# Patient Record
Sex: FEMALE | Race: WHITE | Employment: FULL TIME | URBAN - METROPOLITAN AREA
[De-identification: names, ages, dates, MRNs, and addresses within clinical notes are randomized per-mention and may not be internally consistent; named-entity substitution may affect disease eponyms.]

---

## 2017-04-03 ENCOUNTER — RADIANT APPOINTMENT (OUTPATIENT)
Dept: ULTRASOUND IMAGING | Facility: CLINIC | Age: 32
End: 2017-04-03
Attending: OBSTETRICS & GYNECOLOGY

## 2017-04-03 DIAGNOSIS — Z31.7 ENCOUNTER FOR PROCREATIVE MANAGEMENT AND COUNSELING FOR PERSON ACTING AS GESTATIONAL SURROGATE: ICD-10-CM

## 2017-04-03 PROCEDURE — 76830 TRANSVAGINAL US NON-OB: CPT

## 2017-04-11 ENCOUNTER — DOCUMENTATION ONLY (OUTPATIENT)
Dept: LAB | Facility: CLINIC | Age: 32
End: 2017-04-11

## 2017-04-13 ENCOUNTER — RADIANT APPOINTMENT (OUTPATIENT)
Dept: ULTRASOUND IMAGING | Facility: CLINIC | Age: 32
End: 2017-04-13
Attending: OBSTETRICS & GYNECOLOGY

## 2017-04-13 DIAGNOSIS — Z31.7 ENCOUNTER FOR PROCREATIVE MANAGEMENT AND COUNSELING FOR PERSON ACTING AS GESTATIONAL SURROGATE: ICD-10-CM

## 2017-04-13 DIAGNOSIS — Z33.3 PREGNANCY IN PERSON ACTING AS GESTATIONAL SURROGATE: Primary | ICD-10-CM

## 2017-04-13 LAB
B-HCG SERPL-ACNC: <1 IU/L (ref 0–5)
ESTRADIOL SERPL-MCNC: 42 PG/ML

## 2017-04-13 PROCEDURE — 36415 COLL VENOUS BLD VENIPUNCTURE: CPT | Performed by: OBSTETRICS & GYNECOLOGY

## 2017-04-13 PROCEDURE — 76830 TRANSVAGINAL US NON-OB: CPT

## 2017-04-13 PROCEDURE — 84702 CHORIONIC GONADOTROPIN TEST: CPT | Performed by: OBSTETRICS & GYNECOLOGY

## 2017-04-13 PROCEDURE — 82670 ASSAY OF TOTAL ESTRADIOL: CPT | Performed by: OBSTETRICS & GYNECOLOGY

## 2017-04-19 ENCOUNTER — RADIANT APPOINTMENT (OUTPATIENT)
Dept: ULTRASOUND IMAGING | Facility: CLINIC | Age: 32
End: 2017-04-19
Attending: OBSTETRICS & GYNECOLOGY

## 2017-04-19 DIAGNOSIS — Z33.3 PREGNANCY IN PERSON ACTING AS GESTATIONAL SURROGATE: ICD-10-CM

## 2017-04-19 DIAGNOSIS — Z31.7 ENCOUNTER FOR PROCREATIVE MANAGEMENT AND COUNSELING FOR PERSON ACTING AS GESTATIONAL SURROGATE: ICD-10-CM

## 2017-04-19 LAB
B-HCG SERPL-ACNC: <1 IU/L (ref 0–5)
ESTRADIOL SERPL-MCNC: 391 PG/ML

## 2017-04-19 PROCEDURE — 82670 ASSAY OF TOTAL ESTRADIOL: CPT | Performed by: OBSTETRICS & GYNECOLOGY

## 2017-04-19 PROCEDURE — 84702 CHORIONIC GONADOTROPIN TEST: CPT | Performed by: OBSTETRICS & GYNECOLOGY

## 2017-04-19 PROCEDURE — 76830 TRANSVAGINAL US NON-OB: CPT

## 2017-04-19 PROCEDURE — 36415 COLL VENOUS BLD VENIPUNCTURE: CPT | Performed by: OBSTETRICS & GYNECOLOGY

## 2017-04-26 ENCOUNTER — RADIANT APPOINTMENT (OUTPATIENT)
Dept: ULTRASOUND IMAGING | Facility: CLINIC | Age: 32
End: 2017-04-26
Attending: OBSTETRICS & GYNECOLOGY

## 2017-04-26 DIAGNOSIS — Z31.7 ENCOUNTER FOR PROCREATIVE MANAGEMENT AND COUNSELING FOR PERSON ACTING AS GESTATIONAL SURROGATE: ICD-10-CM

## 2017-04-26 DIAGNOSIS — Z33.3 PREGNANCY IN PERSON ACTING AS GESTATIONAL SURROGATE: ICD-10-CM

## 2017-04-26 LAB
B-HCG SERPL-ACNC: <1 IU/L (ref 0–5)
ESTRADIOL SERPL-MCNC: 700 PG/ML

## 2017-04-26 PROCEDURE — 82670 ASSAY OF TOTAL ESTRADIOL: CPT | Performed by: OBSTETRICS & GYNECOLOGY

## 2017-04-26 PROCEDURE — 76856 US EXAM PELVIC COMPLETE: CPT

## 2017-04-26 PROCEDURE — 76830 TRANSVAGINAL US NON-OB: CPT

## 2017-04-26 PROCEDURE — 36415 COLL VENOUS BLD VENIPUNCTURE: CPT | Performed by: OBSTETRICS & GYNECOLOGY

## 2017-04-26 PROCEDURE — 84702 CHORIONIC GONADOTROPIN TEST: CPT | Performed by: OBSTETRICS & GYNECOLOGY

## 2017-04-27 ENCOUNTER — ALLIED HEALTH/NURSE VISIT (OUTPATIENT)
Dept: NURSING | Facility: CLINIC | Age: 32
End: 2017-04-27

## 2017-04-27 DIAGNOSIS — Z23 ENCOUNTER FOR IMMUNIZATION: Primary | ICD-10-CM

## 2017-04-27 PROCEDURE — 90471 IMMUNIZATION ADMIN: CPT

## 2017-04-27 PROCEDURE — 90746 HEPB VACCINE 3 DOSE ADULT IM: CPT

## 2017-04-27 PROCEDURE — 99207 ZZC NO CHARGE NURSE ONLY: CPT

## 2017-04-27 NOTE — MR AVS SNAPSHOT
After Visit Summary   4/27/2017    Gretchen De Jesus    MRN: [id number]           Patient Information     Date Of Birth          1985        Visit Information        Provider Department      4/27/2017 2:30 PM [facility] [place] Amol Marcial        Today's Diagnoses     Encounter for immunization    -  1       Follow-ups after your visit        Who to contact     If you have questions or need follow up information about today's clinic visit or your schedule please contact [facility] directly at [phone].  Normal or non-critical lab and imaging results will be communicated to you by [employer]hart, letter or phone within 4 business days after the clinic has received the results. If you do not hear from us within 7 days, please contact the clinic through [employer]hart or phone. If you have a critical or abnormal lab result, we will notify you by phone as soon as possible.  Submit refill requests through [email or web address] or call your pharmacy and they will forward the refill request to us. Please allow 3 business days for your refill to be completed.          Additional Information About Your Visit        MyChart Information     [email or web address] gives you secure access to your electronic health record. If you see a primary care provider, you can also send messages to your care team and make appointments. If you have questions, please call your primary care clinic.  If you do not have a primary care provider, please call [phone] and they will assist you.        Care EveryWhere ID     This is your Care EveryWhere ID. This could be used by other organizations to access your [place] medical records  [id number]         Blood Pressure from Last 3 Encounters:   12/27/16 110/64   02/18/16 106/60   07/20/15 104/72    Weight from Last 3 Encounters:   12/27/16 144 lb (65.3 kg)   02/18/16 138 lb 1.6 oz (62.6 kg)   07/20/15 141 lb 12.8 oz (64.3 kg)              We Performed the Following     ADMIN 1st VACCINE      HEPATITIS B VACCINE,ADULT,IM        Primary Care Provider Office Phone # Fax #    Glendy Pilar Kendrick [phone]       [place] [address] [facility] [address] [address]        Thank you!     Thank you for choosing [facility]  for your care. Our goal is always to provide you with excellent care. Hearing back from our patients is one way we can continue to improve our services. Please take a few minutes to complete the written survey that you may receive in the mail after your visit with us. Thank you!             Your Updated Medication List - Protect others around you: Learn how to safely use, store and throw away your medicines at www.disposemymeds.org.      Notice  As of 4/27/2017  2:44 PM    You have not been prescribed any medications.

## 2017-04-27 NOTE — NURSING NOTE
Screening Questionnaire for Adult Immunization    Are you sick today?   No   Do you have allergies to medications, food, a vaccine component or latex?   No   Have you ever had a serious reaction after receiving a vaccination?   No   Do you have a long-term health problem with heart disease, lung disease, asthma, kidney disease, metabolic disease (e.g. diabetes), anemia, or other blood disorder?   No   Do you have cancer, leukemia, HIV/AIDS, or any other immune system problem?   No   In the past 3 months, have you taken medications that affect  your immune system, such as prednisone, other steroids, or anticancer drugs; drugs for the treatment of rheumatoid arthritis, Crohn s disease, or psoriasis; or have you had radiation treatments?   No   Have you had a seizure, or a brain or other nervous system problem?   No   During the past year, have you received a transfusion of blood or blood     products, or been given immune (gamma) globulin or antiviral drug?   No   For women: Are you pregnant or is there a chance you could become        pregnant during the next month?   No   Have you received any vaccinations in the past 4 weeks?   No     Immunization questionnaire answers were all negative.      MNVFC doesn't apply on this patient    Per orders of Dr. Ej Pinedo, injection of Hep B given by Ca Massey. Patient instructed to remain in clinic for 20 minutes afterwards, and to report any adverse reaction to me immediately.       Screening performed by Ca Massey on 4/27/2017 at 2:41 PM.

## 2017-05-05 DIAGNOSIS — E28.9 DISORDER OF ENDOCRINE OVARY: Primary | ICD-10-CM

## 2017-05-08 DIAGNOSIS — E28.9 DISORDER OF ENDOCRINE OVARY: ICD-10-CM

## 2017-05-08 LAB
ESTRADIOL SERPL-MCNC: 451 PG/ML
PROGEST SERPL-MCNC: 65 NG/ML

## 2017-05-08 PROCEDURE — 36415 COLL VENOUS BLD VENIPUNCTURE: CPT | Performed by: OBSTETRICS & GYNECOLOGY

## 2017-05-08 PROCEDURE — 84144 ASSAY OF PROGESTERONE: CPT | Performed by: OBSTETRICS & GYNECOLOGY

## 2017-05-08 PROCEDURE — 82670 ASSAY OF TOTAL ESTRADIOL: CPT | Performed by: OBSTETRICS & GYNECOLOGY

## 2017-05-12 DIAGNOSIS — E28.9 DISORDER OF ENDOCRINE OVARY: ICD-10-CM

## 2017-05-12 LAB
ESTRADIOL SERPL-MCNC: 536 PG/ML
PROGEST SERPL-MCNC: 72.9 NG/ML

## 2017-05-12 PROCEDURE — 84144 ASSAY OF PROGESTERONE: CPT | Performed by: OBSTETRICS & GYNECOLOGY

## 2017-05-12 PROCEDURE — 82670 ASSAY OF TOTAL ESTRADIOL: CPT | Performed by: OBSTETRICS & GYNECOLOGY

## 2017-05-12 PROCEDURE — 36415 COLL VENOUS BLD VENIPUNCTURE: CPT | Performed by: OBSTETRICS & GYNECOLOGY

## 2017-05-18 DIAGNOSIS — E28.9 DISORDER OF ENDOCRINE OVARY: ICD-10-CM

## 2017-05-18 LAB
B-HCG SERPL-ACNC: 765 IU/L (ref 0–5)
ESTRADIOL SERPL-MCNC: 1259 PG/ML
PROGEST SERPL-MCNC: 55.5 NG/ML

## 2017-05-18 PROCEDURE — 36415 COLL VENOUS BLD VENIPUNCTURE: CPT | Performed by: OBSTETRICS & GYNECOLOGY

## 2017-05-18 PROCEDURE — 84702 CHORIONIC GONADOTROPIN TEST: CPT | Performed by: OBSTETRICS & GYNECOLOGY

## 2017-05-18 PROCEDURE — 84144 ASSAY OF PROGESTERONE: CPT | Performed by: OBSTETRICS & GYNECOLOGY

## 2017-05-18 PROCEDURE — 82670 ASSAY OF TOTAL ESTRADIOL: CPT | Performed by: OBSTETRICS & GYNECOLOGY

## 2017-05-22 DIAGNOSIS — E28.9 DISORDER OF ENDOCRINE OVARY: ICD-10-CM

## 2017-05-22 LAB
B-HCG SERPL-ACNC: 2724 IU/L (ref 0–5)
ESTRADIOL SERPL-MCNC: 823 PG/ML
PROGEST SERPL-MCNC: 67.7 NG/ML

## 2017-05-22 PROCEDURE — 36415 COLL VENOUS BLD VENIPUNCTURE: CPT | Performed by: OBSTETRICS & GYNECOLOGY

## 2017-05-22 PROCEDURE — 82670 ASSAY OF TOTAL ESTRADIOL: CPT | Performed by: OBSTETRICS & GYNECOLOGY

## 2017-05-22 PROCEDURE — 84702 CHORIONIC GONADOTROPIN TEST: CPT | Performed by: OBSTETRICS & GYNECOLOGY

## 2017-05-22 PROCEDURE — 84144 ASSAY OF PROGESTERONE: CPT | Performed by: OBSTETRICS & GYNECOLOGY

## 2017-05-30 ENCOUNTER — RADIANT APPOINTMENT (OUTPATIENT)
Dept: ULTRASOUND IMAGING | Facility: CLINIC | Age: 32
End: 2017-05-30
Attending: OBSTETRICS & GYNECOLOGY

## 2017-05-30 DIAGNOSIS — N97.9 FEMALE INFERTILITY: ICD-10-CM

## 2017-05-30 DIAGNOSIS — N97.9 INFERTILITY, FEMALE: ICD-10-CM

## 2017-05-30 LAB
ERYTHROCYTE [DISTWIDTH] IN BLOOD BY AUTOMATED COUNT: 12.5 % (ref 10–15)
ESTRADIOL SERPL-MCNC: 989 PG/ML
HCT VFR BLD AUTO: 40.3 % (ref 35–47)
HGB BLD-MCNC: 13.3 G/DL (ref 11.7–15.7)
MCH RBC QN AUTO: 29.6 PG (ref 26.5–33)
MCHC RBC AUTO-ENTMCNC: 33 G/DL (ref 31.5–36.5)
MCV RBC AUTO: 90 FL (ref 78–100)
PLATELET # BLD AUTO: 329 10E9/L (ref 150–450)
PROGEST SERPL-MCNC: 78.1 NG/ML
RBC # BLD AUTO: 4.5 10E12/L (ref 3.8–5.2)
WBC # BLD AUTO: 12.3 10E9/L (ref 4–11)

## 2017-05-30 PROCEDURE — 76817 TRANSVAGINAL US OBSTETRIC: CPT

## 2017-05-30 PROCEDURE — 36415 COLL VENOUS BLD VENIPUNCTURE: CPT | Performed by: OBSTETRICS & GYNECOLOGY

## 2017-05-30 PROCEDURE — 85027 COMPLETE CBC AUTOMATED: CPT | Performed by: OBSTETRICS & GYNECOLOGY

## 2017-05-30 PROCEDURE — 84144 ASSAY OF PROGESTERONE: CPT | Performed by: OBSTETRICS & GYNECOLOGY

## 2017-05-30 PROCEDURE — 82670 ASSAY OF TOTAL ESTRADIOL: CPT | Performed by: OBSTETRICS & GYNECOLOGY

## 2017-06-08 DIAGNOSIS — N97.9 FEMALE INFERTILITY: ICD-10-CM

## 2017-06-08 LAB
ESTRADIOL SERPL-MCNC: 1332 PG/ML
PROGEST SERPL-MCNC: 67.8 NG/ML

## 2017-06-08 PROCEDURE — 84144 ASSAY OF PROGESTERONE: CPT | Performed by: OBSTETRICS & GYNECOLOGY

## 2017-06-08 PROCEDURE — 82670 ASSAY OF TOTAL ESTRADIOL: CPT | Performed by: OBSTETRICS & GYNECOLOGY

## 2017-06-08 PROCEDURE — 36415 COLL VENOUS BLD VENIPUNCTURE: CPT | Performed by: OBSTETRICS & GYNECOLOGY

## 2017-06-15 ENCOUNTER — RADIANT APPOINTMENT (OUTPATIENT)
Dept: ULTRASOUND IMAGING | Facility: CLINIC | Age: 32
End: 2017-06-15
Attending: OBSTETRICS & GYNECOLOGY

## 2017-06-15 DIAGNOSIS — N97.9 INFERTILITY, FEMALE: ICD-10-CM

## 2017-06-15 DIAGNOSIS — N97.9 FEMALE INFERTILITY: ICD-10-CM

## 2017-06-15 LAB
ESTRADIOL SERPL-MCNC: 1937 PG/ML
PROGEST SERPL-MCNC: 71.4 NG/ML

## 2017-06-15 PROCEDURE — 36415 COLL VENOUS BLD VENIPUNCTURE: CPT | Performed by: OBSTETRICS & GYNECOLOGY

## 2017-06-15 PROCEDURE — 82670 ASSAY OF TOTAL ESTRADIOL: CPT | Performed by: OBSTETRICS & GYNECOLOGY

## 2017-06-15 PROCEDURE — 84144 ASSAY OF PROGESTERONE: CPT | Performed by: OBSTETRICS & GYNECOLOGY

## 2017-06-15 PROCEDURE — 76817 TRANSVAGINAL US OBSTETRIC: CPT

## 2017-06-22 ENCOUNTER — RADIANT APPOINTMENT (OUTPATIENT)
Dept: ULTRASOUND IMAGING | Facility: CLINIC | Age: 32
End: 2017-06-22
Attending: OBSTETRICS & GYNECOLOGY

## 2017-06-22 DIAGNOSIS — N97.9 FEMALE INFERTILITY: ICD-10-CM

## 2017-06-22 DIAGNOSIS — N97.9 INFERTILITY, FEMALE: ICD-10-CM

## 2017-06-22 LAB
ESTRADIOL SERPL-MCNC: 2115 PG/ML
PROGEST SERPL-MCNC: 69.7 NG/ML

## 2017-06-22 PROCEDURE — 82670 ASSAY OF TOTAL ESTRADIOL: CPT | Performed by: OBSTETRICS & GYNECOLOGY

## 2017-06-22 PROCEDURE — 76817 TRANSVAGINAL US OBSTETRIC: CPT

## 2017-06-22 PROCEDURE — 76802 OB US < 14 WKS ADDL FETUS: CPT

## 2017-06-22 PROCEDURE — 84144 ASSAY OF PROGESTERONE: CPT | Performed by: OBSTETRICS & GYNECOLOGY

## 2017-06-22 PROCEDURE — 76801 OB US < 14 WKS SINGLE FETUS: CPT

## 2017-06-22 PROCEDURE — 36415 COLL VENOUS BLD VENIPUNCTURE: CPT | Performed by: OBSTETRICS & GYNECOLOGY

## 2017-07-13 ENCOUNTER — TRANSFERRED RECORDS (OUTPATIENT)
Dept: HEALTH INFORMATION MANAGEMENT | Facility: CLINIC | Age: 32
End: 2017-07-13

## 2017-12-15 ENCOUNTER — TRANSFERRED RECORDS (OUTPATIENT)
Dept: HEALTH INFORMATION MANAGEMENT | Facility: CLINIC | Age: 32
End: 2017-12-15

## 2018-10-31 ENCOUNTER — OFFICE VISIT (OUTPATIENT)
Dept: FAMILY MEDICINE | Facility: CLINIC | Age: 33
End: 2018-10-31
Payer: COMMERCIAL

## 2018-10-31 VITALS
DIASTOLIC BLOOD PRESSURE: 76 MMHG | WEIGHT: 161 LBS | RESPIRATION RATE: 16 BRPM | TEMPERATURE: 97.2 F | OXYGEN SATURATION: 97 % | HEART RATE: 92 BPM | BODY MASS INDEX: 29.63 KG/M2 | HEIGHT: 62 IN | SYSTOLIC BLOOD PRESSURE: 110 MMHG

## 2018-10-31 DIAGNOSIS — Z00.00 WELL ADULT EXAM: Primary | ICD-10-CM

## 2018-10-31 LAB
CHOLEST SERPL-MCNC: 171 MG/DL
HDLC SERPL-MCNC: 52 MG/DL
LDLC SERPL CALC-MCNC: 93 MG/DL
NONHDLC SERPL-MCNC: 119 MG/DL
TRIGL SERPL-MCNC: 130 MG/DL

## 2018-10-31 PROCEDURE — 36415 COLL VENOUS BLD VENIPUNCTURE: CPT | Performed by: OBSTETRICS & GYNECOLOGY

## 2018-10-31 PROCEDURE — G0145 SCR C/V CYTO,THINLAYER,RESCR: HCPCS | Performed by: OBSTETRICS & GYNECOLOGY

## 2018-10-31 PROCEDURE — 87591 N.GONORRHOEAE DNA AMP PROB: CPT | Performed by: OBSTETRICS & GYNECOLOGY

## 2018-10-31 PROCEDURE — 80061 LIPID PANEL: CPT | Performed by: OBSTETRICS & GYNECOLOGY

## 2018-10-31 PROCEDURE — 87624 HPV HI-RISK TYP POOLED RSLT: CPT | Performed by: OBSTETRICS & GYNECOLOGY

## 2018-10-31 PROCEDURE — 99395 PREV VISIT EST AGE 18-39: CPT | Performed by: OBSTETRICS & GYNECOLOGY

## 2018-10-31 PROCEDURE — 87491 CHLMYD TRACH DNA AMP PROBE: CPT | Performed by: OBSTETRICS & GYNECOLOGY

## 2018-10-31 ASSESSMENT — ENCOUNTER SYMPTOMS
DIARRHEA: 0
DYSURIA: 0
NERVOUS/ANXIOUS: 0
PALPITATIONS: 0
COUGH: 0
DIZZINESS: 0
HEARTBURN: 0
ABDOMINAL PAIN: 0
SHORTNESS OF BREATH: 0
WEAKNESS: 0
SORE THROAT: 0
CONSTIPATION: 0
PARESTHESIAS: 0
NAUSEA: 0
HEADACHES: 0
HEMATURIA: 0
ARTHRALGIAS: 0
FEVER: 0
MYALGIAS: 1
JOINT SWELLING: 0
BREAST MASS: 0
EYE PAIN: 0
CHILLS: 0
HEMATOCHEZIA: 0
FREQUENCY: 0

## 2018-10-31 ASSESSMENT — PAIN SCALES - GENERAL: PAINLEVEL: NO PAIN (0)

## 2018-10-31 NOTE — PROGRESS NOTES
Subjective:Gretchen is here for yearly physical. Current concerns are:  She wants a pap/pelvic exam because she is planning to become a surrogate parent.  She previously delivered twins by  section for a surrogacy program.  She tolerated that well.  Plans to do a single embryo this time.        Past Medical History:   Diagnosis Date     Abnormal glandular Papanicolaou smear of cervix     Abn. Pap smear (cervix)     Allergic rhinitis, cause unspecified     Allergic rhinitis     Cervical dysplasia      Hypoglycemia      Migraine      Other spontaneous pneumothorax 06    D/C 06-left spontaneous pneumothorax     PCOS (polycystic ovarian syndrome)       No current outpatient prescriptions on file.      No Known Allergies   History   Smoking Status     Former Smoker     Packs/day: 0.50     Years: 3.00     Quit date: 2004   Smokeless Tobacco     Never Used     Comment:  smokes - discussed secondhand smoke      Past Surgical History:   Procedure Laterality Date     CONIZATION LEEP  08    LEEP     HC EXCIS PRIMARY GANGLION WRIST  7/28/10     HC EXCISION TENDON SHEATH LESION, HAND/FINGR  09/15/10    left hand     HYSTEROSCOPY  02/01/10     and dilation and curettage and removal of uterine polyp     SURGICAL HISTORY OF -   age 3    ?eye surgery      Social History   Substance Use Topics     Smoking status: Former Smoker     Packs/day: 0.50     Years: 3.00     Quit date: 2004     Smokeless tobacco: Never Used      Comment:  smokes - discussed secondhand smoke     Alcohol use Yes      Comment: infrequently      Family History   Problem Relation Age of Onset     Neurologic Disorder Mother      brain, fibromyalgia     Cancer Maternal Grandfather      skin     HEART DISEASE Maternal Grandfather      Breast Cancer Maternal Grandmother      HEART DISEASE Paternal Grandfather      Diabetes Paternal Grandfather      Cerebrovascular Disease Paternal Grandfather      Breast Cancer Maternal  "Aunt        ROS: A 12 point review of systems is negative except for the following: See above      Physical Exam: /76  Pulse 92  Temp 97.2  F (36.2  C) (Temporal)  Resp 16  Ht 5' 2\" (1.575 m)  Wt 161 lb (73 kg)  SpO2 97%  Breastfeeding? No  BMI 29.45 kg/m2  HEENT:    Sclerae and conjunctiva are normal.   Ear canals and TMs look normal.  Nasal mucosa is pink  - no polyps or masses seen.  sinuses are non tender to palpation.  Throat is unremarkable . Mucous membranes are moist.   Neck is supple, mobile, no adenopathy or masses palpable. The thyroid feels normal.   Normal range of motion noted.  Chest is clear to auscultation.  No wheezes, rales or rhonchi heard.  Cardiac exam is normal with s1, s2, no murmurs or adventitious sounds.Normal rate and rhythm is heard.   Abdomen is soft,  nondistended, No masses felt.No HSM. No guarding or rigidity or rebound   noted. Palpation reveals  no    tenderness   Normal bowel sounds heard.  The  section scar is well-healed.  Pelvic exam:My nurse Cecy   was present to chaperone the exam.  The external genitalia appeared normal.    The vaginal vault was without bleeding  or   discharge   or odor.   The cervix was smooth   and shiny and normal in appearance.    A pap was obtained.   A gen probe   was obtained.    No vaginal support defects were noted,    Bimanual exam revealed a 6 week  sized uterus. It does not   descend   well in the vaginal vault.    No adnexal masses were felt.    There was no  cervical motion tenderness.    Exam was somewhat limited by the patient's body habitus.            The breast exam was declined.      We did discuss the option for an exam   and I suggested that she should be doing a self-breast exam   monthly and after the age of 40 a yearly mammogram   and she feels comfortable that this is sufficient.         Assessment/Plan:    The yearly exam is normal.        Additional Plan:Diet and rest and exercise discussed.      I have " advised going for a 5 mile walk daily if possible       See labs and orders.    .Immunizations reviewed(TDAP, pneumovax, shingles vaccine,etc)and discussed-including advice for yearly flu shot/tetanus update.       Vaccines were discussed and  declined        Calcium supplementation advised       Labs done: see orders -lipids-    I advised the following exams with specialists:    1. Dental evaluation yearly    2. Dermatology evaluation for mole exam yearly    3. Ophthalmology exam yearly to check for glaucoma, etc      Flu vax offered- she declined    Living will was discussed.         Followup in 12   months, sooner if concerns arise.   ALMA DELIA Rodriges MD(electronic signature)    Answers for HPI/ROS submitted by the patient on 10/31/2018   Annual Exam:  Frequency of exercise:: 1 day/week  Getting at least 3 servings of Calcium per day:: Yes  Diet:: Other  Taking medications regularly:: No  Medication side effects:: None  Bi-annual eye exam:: NO  Dental care twice a year:: NO  Sleep apnea or symptoms of sleep apnea:: None  Positive for the following: myalgias  Negative for the following: abdominal pain, Blood in stool, Blood in urine, chest pain, chills, congestion, constipation, cough, diarrhea, dizziness, ear pain, eye pain, nervous/anxious, fever, frequency, genital sores, headaches, hearing loss, heartburn, arthralgias, joint swelling, peripheral edema, mood changes, nausea, dysuria, palpitations, Skin sensation changes, sore throat, urgency, rash, shortness of breath, visual disturbance, weakness  pelvic pain: No  vaginal bleeding: No  vaginal discharge: No  tenderness: No  breast mass: No  breast discharge: No  Additional concerns today:: No  PHQ-2 Score: 0  Duration of exercise:: 15-30 minutes  Barriers to taking medications:: None

## 2018-10-31 NOTE — PROGRESS NOTES
Cecy Please inform Gretchen/ or caretaker  that this result(s) is/are normal.  Thanks. ALMA DELIA Rodriges MD

## 2018-10-31 NOTE — MR AVS SNAPSHOT
After Visit Summary   10/31/2018    Gretchen De Jesus    MRN: [id number]           Patient Information     Date Of Birth          1985        Visit Information        Provider Department      10/31/2018 10:30 AM Eamon Rodriges MD [facility]        Today's Diagnoses     Well adult exam    -  1       Follow-ups after your visit        Follow-up notes from your care team     Return in about 1 year (around 10/31/2019) for Physical Exam.      Future tests that were ordered for you today     Open Future Orders        Priority Expected Expires Ordered    Lipid Profile Routine  10/31/2019 10/31/2018            Who to contact     If you have questions or need follow up information about today's clinic visit or your schedule please contact [facility] directly at [phone].  Normal or non-critical lab and imaging results will be communicated to you by MyChart, letter or phone within 4 business days after the clinic has received the results. If you do not hear from us within 7 days, please contact the clinic through MyChart or phone. If you have a critical or abnormal lab result, we will notify you by phone as soon as possible.  Submit refill requests through [employer] or call your pharmacy and they will forward the refill request to us. Please allow 3 business days for your refill to be completed.          Additional Information About Your Visit        MyChart Information     [employer] gives you secure access to your electronic health record. If you see a primary care provider, you can also send messages to your care team and make appointments. If you have questions, please call your primary care clinic.  If you do not have a primary care provider, please call [phone] and they will assist you.        Care EveryWhere ID     This is your Care EveryWhere ID. This could be used by other organizations to access your [place] medical records  [id number]       "  Your Vitals Were     Pulse Temperature Respirations Height Pulse Oximetry Breastfeeding?    92 97.2  F (36.2  C) (Temporal) 16 5' 2\" (1.575 m) 97% No    BMI (Body Mass Index)                   29.45 kg/m2            Blood Pressure from Last 3 Encounters:   10/31/18 110/76   12/27/16 110/64   02/18/16 106/60    Weight from Last 3 Encounters:   10/31/18 161 lb (73 kg)   12/27/16 144 lb (65.3 kg)   02/18/16 138 lb 1.6 oz (62.6 kg)              We Performed the Following     CHLAMYDIA TRACHOMATIS PCR     HPV High Risk Types DNA Cervical     NEISSERIA GONORRHOEA PCR     Pap imaged thin layer screen with HPV - recommended age 30 - 65 years (select HPV order below)        Primary Care Provider Office Phone # Fax #    Glendy Kendrick [phone] [address] [place] [address]        Equal Access to Services     JAC ESPARZA : Hadii carlitos ku hadasho Soomaali, waaxda luqadaha, qaybta kaalmada adeegyada, waxay kalain haywilyn tammi salazar . So [facility] [phone].    ATENCIÓN: Si habla espmax, tiene a rojas disposición servicios gratuitos de asistencia lingüística. Llame al [phone].    We comply with applicable federal civil rights laws and [place] laws. We do not discriminate on the basis of race, color, national origin, age, disability, sex, sexual orientation, or gender identity.            Thank you!     Thank you for choosing [facility]  for your care. Our goal is always to provide you with excellent care. Hearing back from our patients is one way we can continue to improve our services. Please take a few minutes to complete the written survey that you may receive in the mail after your visit with us. Thank you!             Your Updated Medication List - Protect others around you: Learn how to safely use, store and throw away your medicines at www.disposemymeds.org.      Notice  As of 10/31/2018 11:19 AM    You have not been prescribed any medications.    "

## 2018-11-01 LAB
C TRACH DNA SPEC QL NAA+PROBE: NEGATIVE
N GONORRHOEA DNA SPEC QL NAA+PROBE: NEGATIVE
SPECIMEN SOURCE: NORMAL
SPECIMEN SOURCE: NORMAL

## 2018-11-02 LAB
COPATH REPORT: NORMAL
PAP: NORMAL

## 2018-11-05 LAB
FINAL DIAGNOSIS: NORMAL
HPV HR 12 DNA CVX QL NAA+PROBE: NEGATIVE
HPV16 DNA SPEC QL NAA+PROBE: NEGATIVE
HPV18 DNA SPEC QL NAA+PROBE: NEGATIVE
SPECIMEN DESCRIPTION: NORMAL
SPECIMEN SOURCE CVX/VAG CYTO: NORMAL

## 2019-01-25 ENCOUNTER — OFFICE VISIT (OUTPATIENT)
Dept: FAMILY MEDICINE | Facility: OTHER | Age: 34
End: 2019-01-25
Payer: COMMERCIAL

## 2019-01-25 VITALS
BODY MASS INDEX: 30.18 KG/M2 | TEMPERATURE: 98.3 F | OXYGEN SATURATION: 97 % | HEIGHT: 62 IN | HEART RATE: 107 BPM | SYSTOLIC BLOOD PRESSURE: 114 MMHG | DIASTOLIC BLOOD PRESSURE: 64 MMHG | WEIGHT: 164 LBS

## 2019-01-25 DIAGNOSIS — R05.9 COUGH: ICD-10-CM

## 2019-01-25 DIAGNOSIS — J02.9 SORE THROAT: Primary | ICD-10-CM

## 2019-01-25 LAB
DEPRECATED S PYO AG THROAT QL EIA: NORMAL
SPECIMEN SOURCE: NORMAL

## 2019-01-25 PROCEDURE — 87880 STREP A ASSAY W/OPTIC: CPT | Performed by: PHYSICIAN ASSISTANT

## 2019-01-25 PROCEDURE — 99213 OFFICE O/P EST LOW 20 MIN: CPT | Performed by: PHYSICIAN ASSISTANT

## 2019-01-25 PROCEDURE — 87081 CULTURE SCREEN ONLY: CPT | Performed by: PHYSICIAN ASSISTANT

## 2019-01-25 RX ORDER — ALBUTEROL SULFATE 90 UG/1
2 AEROSOL, METERED RESPIRATORY (INHALATION) EVERY 6 HOURS
Qty: 1 INHALER | Refills: 1 | Status: SHIPPED | OUTPATIENT
Start: 2019-01-25 | End: 2019-03-21

## 2019-01-25 ASSESSMENT — MIFFLIN-ST. JEOR: SCORE: 1393.18

## 2019-01-25 ASSESSMENT — PAIN SCALES - GENERAL: PAINLEVEL: MODERATE PAIN (4)

## 2019-01-25 NOTE — PROGRESS NOTES
SUBJECTIVE:   Gretchen De Jesus is a 34 year old female who presents to clinic today for the following health issues:    HPI  Acute Illness   Acute illness concerns: sore throat cough    Onset: 2 days    Fever: YES    Chills/Sweats: YES    Headache (location?): YES    Sinus Pressure:no    Conjunctivitis:  yes    Ear Pain: no    Rhinorrhea: YES    Congestion: no     Sore Throat: YES     Cough: YES    Wheeze: no     Decreased Appetite: no     Nausea: no     Vomiting: no     Diarrhea:  no     Dysuria/Freq.: no     Fatigue/Achiness: YES    Sick/Strep Exposure: YES     Therapies Tried and outcome: nyquil and tylenol    Patient presents today for evaluation of a sore throat and cough. She reports throat pain started 2 days ago. Feels swollen and irritated. She has had some fevers off and on. She reports a headache as well. No sinus congestion or pain. She was exposed to friend's kids who had strep.    She also reports she has had a hacking cough that has been persisting for several months. Not bothersome - just annoying. She denies chest tightness or shortness of breath. She reports getting similar symptoms each year since she had a collapsed lung as a teen. Albuterol is usually very helpful.     Problem list and histories reviewed & adjusted, as indicated.  Additional history: as documented    Patient Active Problem List   Diagnosis     CARDIOVASCULAR SCREENING; LDL GOAL LESS THAN 160     HSIL (high grade squamous intraepithelial lesion) on Pap smear     Other disorder of menstruation and other abnormal bleeding from female genital tract     Past Surgical History:   Procedure Laterality Date     CONIZATION LEEP  05/12/08    LEEP     HC EXCIS PRIMARY GANGLION WRIST  7/28/10     HC EXCISION TENDON SHEATH LESION, HAND/FINGR  09/15/10    left hand     HYSTEROSCOPY  02/01/10     and dilation and curettage and removal of uterine polyp     SURGICAL HISTORY OF -   age 3    ?eye surgery       Social History     Tobacco Use  "    Smoking status: Former Smoker     Packs/day: 0.50     Years: 3.00     Pack years: 1.50     Last attempt to quit: 2004     Years since quittin.7     Smokeless tobacco: Never Used     Tobacco comment:  smokes - discussed secondhand smoke   Substance Use Topics     Alcohol use: Yes     Comment: infrequently     Family History   Problem Relation Age of Onset     Neurologic Disorder Mother         brain, fibromyalgia     Cancer Maternal Grandfather         skin     Heart Disease Maternal Grandfather      Breast Cancer Maternal Grandmother      Heart Disease Paternal Grandfather      Diabetes Paternal Grandfather      Cerebrovascular Disease Paternal Grandfather      Breast Cancer Maternal Aunt          No current outpatient medications on file.     No Known Allergies  BP Readings from Last 3 Encounters:   19 114/64   10/31/18 110/76   16 110/64    Wt Readings from Last 3 Encounters:   19 74.4 kg (164 lb)   10/31/18 73 kg (161 lb)   16 65.3 kg (144 lb)         ROS:  Constitutional, HEENT, cardiovascular, pulmonary, gi and gu systems are negative, except as otherwise noted.    OBJECTIVE:     /64   Pulse 107   Temp 98.3  F (36.8  C) (Temporal)   Ht 1.568 m (5' 1.75\")   Wt 74.4 kg (164 lb)   LMP 2019 (Exact Date)   SpO2 97%   BMI 30.24 kg/m    Body mass index is 30.24 kg/m .  GENERAL: healthy, alert and no distress  HENT: ear canals and TM's normal, nose and mouth without ulcers or lesions  NECK: no adenopathy, no asymmetry, masses, or scars and thyroid normal to palpation  RESP: lungs clear to auscultation - no rales, rhonchi or wheezes  CV: regular rate and rhythm, normal S1 S2, no S3 or S4, no murmur, click or rub, no peripheral edema and peripheral pulses strong  SKIN: no suspicious lesions or rashes  PSYCH: mentation appears normal, affect normal/bright    Diagnostic Test Results:  Strep screen - Negative    ASSESSMENT/PLAN:     1. Sore throat  Rapid strep " negative. Culture pending. Will call if throat culture is positive. Encouraged continuation of supportive cares with rest, fluids and tylenol/ibuprofen as needed.   - Strep, Rapid Screen  - Beta strep group A culture    2. Cough  Patient has done well with albuterol in the past. Discussed use every 4-6 hours as needed. Recheck if symptoms worsen or persist.   - albuterol (PROAIR HFA/PROVENTIL HFA/VENTOLIN HFA) 108 (90 Base) MCG/ACT inhaler; Inhale 2 puffs into the lungs every 6 hours  Dispense: 1 Inhaler; Refill: 1    The patient indicates understanding of these issues and agrees with the plan.    Yessenia Tompkins PA-C  [facility]

## 2019-01-27 LAB
BACTERIA SPEC CULT: NORMAL
SPECIMEN SOURCE: NORMAL

## 2019-02-27 DIAGNOSIS — Z13.89 SCREENING FOR SUBSTANCE ABUSE: ICD-10-CM

## 2019-02-27 DIAGNOSIS — Z13.228 SCREENING FOR METABOLIC DISORDER: ICD-10-CM

## 2019-02-27 DIAGNOSIS — Z11.8 SPECIAL SCREENING EXAMINATION FOR CHLAMYDIAL DISEASE: ICD-10-CM

## 2019-02-27 DIAGNOSIS — Z11.59 SPECIAL SCREENING EXAMINATION FOR VIRAL DISEASE: ICD-10-CM

## 2019-02-27 DIAGNOSIS — Z13.29 THYROID DISORDER SCREEN: ICD-10-CM

## 2019-02-27 DIAGNOSIS — Z11.4 SCREENING FOR HUMAN IMMUNODEFICIENCY VIRUS: ICD-10-CM

## 2019-02-27 DIAGNOSIS — Z01.84 IMMUNITY STATUS TESTING: ICD-10-CM

## 2019-02-27 DIAGNOSIS — Z01.83 ENCOUNTER FOR BLOOD TYPING: ICD-10-CM

## 2019-02-27 DIAGNOSIS — Z01.812 ENCOUNTER FOR PREPROCEDURAL LABORATORY EXAMINATION: ICD-10-CM

## 2019-02-27 DIAGNOSIS — Z31.89 ENCOUNTER FOR OTHER PROCREATIVE MANAGEMENT: Primary | ICD-10-CM

## 2019-02-27 LAB
AMPHETAMINES UR QL: NOT DETECTED NG/ML
BARBITURATES UR QL SCN: NOT DETECTED NG/ML
BASOPHILS # BLD AUTO: 0 10E9/L (ref 0–0.2)
BASOPHILS NFR BLD AUTO: 0.2 %
BENZODIAZ UR QL SCN: NOT DETECTED NG/ML
BUPRENORPHINE UR QL: NOT DETECTED NG/ML
CANNABINOIDS UR QL: NOT DETECTED NG/ML
COCAINE UR QL SCN: NOT DETECTED NG/ML
D-METHAMPHET UR QL: ABNORMAL NG/ML
DIFFERENTIAL METHOD BLD: NORMAL
EOSINOPHIL # BLD AUTO: 0.1 10E9/L (ref 0–0.7)
EOSINOPHIL NFR BLD AUTO: 1.4 %
ERYTHROCYTE [DISTWIDTH] IN BLOOD BY AUTOMATED COUNT: 12.8 % (ref 10–15)
HCT VFR BLD AUTO: 39.2 % (ref 35–47)
HGB BLD-MCNC: 12.7 G/DL (ref 11.7–15.7)
LYMPHOCYTES # BLD AUTO: 2.9 10E9/L (ref 0.8–5.3)
LYMPHOCYTES NFR BLD AUTO: 34.5 %
MCH RBC QN AUTO: 29.2 PG (ref 26.5–33)
MCHC RBC AUTO-ENTMCNC: 32.4 G/DL (ref 31.5–36.5)
MCV RBC AUTO: 90 FL (ref 78–100)
METHADONE UR QL SCN: NOT DETECTED NG/ML
MONOCYTES # BLD AUTO: 0.6 10E9/L (ref 0–1.3)
MONOCYTES NFR BLD AUTO: 7.3 %
NEUTROPHILS # BLD AUTO: 4.8 10E9/L (ref 1.6–8.3)
NEUTROPHILS NFR BLD AUTO: 56.6 %
OPIATES UR QL SCN: ABNORMAL NG/ML
OXYCODONE UR QL SCN: NOT DETECTED NG/ML
PCP UR QL SCN: NOT DETECTED NG/ML
PLATELET # BLD AUTO: 323 10E9/L (ref 150–450)
PROPOXYPH UR QL: NOT DETECTED NG/ML
RBC # BLD AUTO: 4.35 10E12/L (ref 3.8–5.2)
TRICYCLICS UR QL SCN: NOT DETECTED NG/ML
WBC # BLD AUTO: 8.5 10E9/L (ref 4–11)

## 2019-02-27 PROCEDURE — 87340 HEPATITIS B SURFACE AG IA: CPT | Performed by: SPECIALIST

## 2019-02-27 PROCEDURE — 84443 ASSAY THYROID STIM HORMONE: CPT | Performed by: SPECIALIST

## 2019-02-27 PROCEDURE — 84439 ASSAY OF FREE THYROXINE: CPT | Performed by: SPECIALIST

## 2019-02-27 PROCEDURE — 87591 N.GONORRHOEAE DNA AMP PROB: CPT | Performed by: SPECIALIST

## 2019-02-27 PROCEDURE — 80306 DRUG TEST PRSMV INSTRMNT: CPT | Performed by: SPECIALIST

## 2019-02-27 PROCEDURE — 86850 RBC ANTIBODY SCREEN: CPT | Performed by: SPECIALIST

## 2019-02-27 PROCEDURE — 86803 HEPATITIS C AB TEST: CPT | Performed by: SPECIALIST

## 2019-02-27 PROCEDURE — 86787 VARICELLA-ZOSTER ANTIBODY: CPT | Performed by: SPECIALIST

## 2019-02-27 PROCEDURE — 80053 COMPREHEN METABOLIC PANEL: CPT | Performed by: SPECIALIST

## 2019-02-27 PROCEDURE — 82306 VITAMIN D 25 HYDROXY: CPT | Performed by: SPECIALIST

## 2019-02-27 PROCEDURE — 86900 BLOOD TYPING SEROLOGIC ABO: CPT | Performed by: SPECIALIST

## 2019-02-27 PROCEDURE — 87389 HIV-1 AG W/HIV-1&-2 AB AG IA: CPT | Performed by: SPECIALIST

## 2019-02-27 PROCEDURE — 0064U ANTB TP TOTAL&RPR IA QUAL: CPT | Performed by: SPECIALIST

## 2019-02-27 PROCEDURE — 36415 COLL VENOUS BLD VENIPUNCTURE: CPT | Performed by: SPECIALIST

## 2019-02-27 PROCEDURE — 86901 BLOOD TYPING SEROLOGIC RH(D): CPT | Performed by: SPECIALIST

## 2019-02-27 PROCEDURE — 85025 COMPLETE CBC W/AUTO DIFF WBC: CPT | Performed by: SPECIALIST

## 2019-02-27 PROCEDURE — 87491 CHLMYD TRACH DNA AMP PROBE: CPT | Performed by: SPECIALIST

## 2019-02-27 PROCEDURE — 86762 RUBELLA ANTIBODY: CPT | Performed by: SPECIALIST

## 2019-02-28 LAB
ABO + RH BLD: NORMAL
ABO + RH BLD: NORMAL
ALBUMIN SERPL-MCNC: 3.9 G/DL (ref 3.4–5)
ALP SERPL-CCNC: 84 U/L (ref 40–150)
ALT SERPL W P-5'-P-CCNC: 43 U/L (ref 0–50)
ANION GAP SERPL CALCULATED.3IONS-SCNC: 7 MMOL/L (ref 3–14)
AST SERPL W P-5'-P-CCNC: 32 U/L (ref 0–45)
BILIRUB SERPL-MCNC: 0.2 MG/DL (ref 0.2–1.3)
BLD GP AB SCN SERPL QL: NORMAL
BLOOD BANK CMNT PATIENT-IMP: NORMAL
BUN SERPL-MCNC: 16 MG/DL (ref 7–30)
CALCIUM SERPL-MCNC: 8.8 MG/DL (ref 8.5–10.1)
CHLORIDE SERPL-SCNC: 107 MMOL/L (ref 94–109)
CO2 SERPL-SCNC: 25 MMOL/L (ref 20–32)
CREAT SERPL-MCNC: 0.69 MG/DL (ref 0.52–1.04)
GFR SERPL CREATININE-BSD FRML MDRD: >90 ML/MIN/{1.73_M2}
GLUCOSE SERPL-MCNC: 89 MG/DL (ref 70–99)
POTASSIUM SERPL-SCNC: 4 MMOL/L (ref 3.4–5.3)
PROT SERPL-MCNC: 7.6 G/DL (ref 6.8–8.8)
RUBV IGG SERPL IA-ACNC: 35 IU/ML
SODIUM SERPL-SCNC: 139 MMOL/L (ref 133–144)
SPECIMEN EXP DATE BLD: NORMAL
T PALLIDUM AB SER QL: NONREACTIVE
T4 FREE SERPL-MCNC: 1.03 NG/DL (ref 0.76–1.46)
TSH SERPL DL<=0.005 MIU/L-ACNC: 1.08 MU/L (ref 0.4–4)
VZV IGG SER QL IA: 5.5 AI (ref 0–0.8)

## 2019-03-01 LAB
C TRACH DNA SPEC QL NAA+PROBE: NEGATIVE
DEPRECATED CALCIDIOL+CALCIFEROL SERPL-MC: 25 UG/L (ref 20–75)
HBV SURFACE AG SERPL QL IA: NONREACTIVE
HCV AB SERPL QL IA: NONREACTIVE
HIV 1+2 AB+HIV1 P24 AG SERPL QL IA: NONREACTIVE
N GONORRHOEA DNA SPEC QL NAA+PROBE: NEGATIVE
SPECIMEN SOURCE: NORMAL
SPECIMEN SOURCE: NORMAL

## 2019-03-08 ENCOUNTER — OFFICE VISIT (OUTPATIENT)
Dept: FAMILY MEDICINE | Facility: CLINIC | Age: 34
End: 2019-03-08
Payer: COMMERCIAL

## 2019-03-08 VITALS
OXYGEN SATURATION: 97 % | SYSTOLIC BLOOD PRESSURE: 108 MMHG | RESPIRATION RATE: 14 BRPM | WEIGHT: 164 LBS | HEIGHT: 62 IN | DIASTOLIC BLOOD PRESSURE: 72 MMHG | HEART RATE: 100 BPM | TEMPERATURE: 97.7 F | BODY MASS INDEX: 30.18 KG/M2

## 2019-03-08 DIAGNOSIS — N84.0 ENDOMETRIAL POLYP: Primary | ICD-10-CM

## 2019-03-08 DIAGNOSIS — J02.0 STREPTOCOCCAL SORE THROAT: ICD-10-CM

## 2019-03-08 DIAGNOSIS — R05.9 COUGH: ICD-10-CM

## 2019-03-08 DIAGNOSIS — J06.9 UPPER RESPIRATORY TRACT INFECTION, UNSPECIFIED TYPE: ICD-10-CM

## 2019-03-08 LAB
DEPRECATED S PYO AG THROAT QL EIA: NORMAL
FLUAV+FLUBV AG SPEC QL: NEGATIVE
FLUAV+FLUBV AG SPEC QL: NEGATIVE
SPECIMEN SOURCE: NORMAL
SPECIMEN SOURCE: NORMAL

## 2019-03-08 PROCEDURE — 87880 STREP A ASSAY W/OPTIC: CPT | Performed by: OBSTETRICS & GYNECOLOGY

## 2019-03-08 PROCEDURE — 87081 CULTURE SCREEN ONLY: CPT | Performed by: OBSTETRICS & GYNECOLOGY

## 2019-03-08 PROCEDURE — 99214 OFFICE O/P EST MOD 30 MIN: CPT | Performed by: OBSTETRICS & GYNECOLOGY

## 2019-03-08 PROCEDURE — 87804 INFLUENZA ASSAY W/OPTIC: CPT | Performed by: OBSTETRICS & GYNECOLOGY

## 2019-03-08 RX ORDER — DESOGESTREL AND ETHINYL ESTRADIOL 0.15-0.03
1 KIT ORAL DAILY
COMMUNITY
End: 2020-02-04

## 2019-03-08 RX ORDER — AZITHROMYCIN 250 MG/1
TABLET, FILM COATED ORAL
Qty: 6 TABLET | Refills: 0 | Status: SHIPPED | OUTPATIENT
Start: 2019-03-08 | End: 2019-03-21

## 2019-03-08 RX ORDER — FAMOTIDINE 20 MG
1000 TABLET ORAL 2 TIMES DAILY
COMMUNITY

## 2019-03-08 RX ORDER — VITAMIN A ACETATE, .BETA.-CAROTENE, ASCORBIC ACID, CHOLECALCIFEROL, .ALPHA.-TOCOPHEROL ACETATE, DL-, THIAMINE MONONITRATE, RIBOFLAVIN, NIACINAMIDE, PYRIDOXINE HYDROCHLORIDE, FOLIC ACID, CYANOCOBALAMIN, CALCIUM CARBONATE, FERROUS FUMARATE, ZINC OXIDE, AND CUPRIC OXIDE 2000; 2000; 120; 400; 22; 1.84; 3; 20; 10; 1; 12; 200; 27; 25; 2 [IU]/1; [IU]/1; MG/1; [IU]/1; MG/1; MG/1; MG/1; MG/1; MG/1; MG/1; UG/1; MG/1; MG/1; MG/1; MG/1
1 TABLET ORAL DAILY
COMMUNITY
End: 2020-02-04

## 2019-03-08 ASSESSMENT — MIFFLIN-ST. JEOR: SCORE: 1393.18

## 2019-03-08 ASSESSMENT — PAIN SCALES - GENERAL: PAINLEVEL: NO PAIN (0)

## 2019-03-08 NOTE — PROGRESS NOTES
SUBJECTIVE:                                                      Referral from Glendy Kendrick       Reason for consultation:  Uterine polyps    History:  Gretchen  Is a 34 year old female  2 para 1103( 1  at term and then a  section at 35 weeks from placenta previa with a twin delivery in this pregnancy was a surrogacy pregnancy)   who presents today because of recent saline hysteroscopy which revealed possible multiple polyps she was advised by her reproductive endocrinologist to have a hysteroscopy dilatation and curettage to remove the polyps as soon as possible because she is planning to become a surrogate again.    Additionally she is complaining of a sore throat and a cough productive of some yellow sputum.  She did not Receive a flu vaccination.  No fever.    Patient Active Problem List   Diagnosis     CARDIOVASCULAR SCREENING; LDL GOAL LESS THAN 160     HSIL (high grade squamous intraepithelial lesion) on Pap smear     Other disorder of menstruation and other abnormal bleeding from female genital tract     Past Surgical History:   Procedure Laterality Date     CONIZATION LEEP  08    LEEP     HC EXCIS PRIMARY GANGLION WRIST  7/28/10     HC EXCISION TENDON SHEATH LESION, HAND/FINGR  09/15/10    left hand     HYSTEROSCOPY  02/01/10     and dilation and curettage and removal of uterine polyp     SURGICAL HISTORY OF -   age 3    ?eye surgery       Social History     Tobacco Use     Smoking status: Former Smoker     Packs/day: 0.50     Years: 3.00     Pack years: 1.50     Last attempt to quit: 2004     Years since quittin.8     Smokeless tobacco: Never Used     Tobacco comment:  smokes - discussed secondhand smoke   Substance Use Topics     Alcohol use: Yes     Comment: infrequently     Family History   Problem Relation Age of Onset     Neurologic Disorder Mother         brain, fibromyalgia     Cancer Maternal Grandfather         skin     Heart Disease  "Maternal Grandfather      Breast Cancer Maternal Grandmother      Heart Disease Paternal Grandfather      Diabetes Paternal Grandfather      Cerebrovascular Disease Paternal Grandfather      Breast Cancer Maternal Aunt          Current Outpatient Medications   Medication Sig Dispense Refill     albuterol (PROAIR HFA/PROVENTIL HFA/VENTOLIN HFA) 108 (90 Base) MCG/ACT inhaler Inhale 2 puffs into the lungs every 6 hours 1 Inhaler 1     desogestrel-ethinyl estradiol (APRI) 0.15-30 MG-MCG tablet Take 1 tablet by mouth daily       Prenatal Vit-Fe Fumarate-FA (PNV PRENATAL PLUS MULTIVITAMIN) 27-1 MG TABS per tablet Take 1 tablet by mouth daily       Vitamin D, Cholecalciferol, 1000 units CAPS Take 1,000 Int'l Units by mouth 2 times daily         ROS:  A 12 point systems review is negative except for what is listed above in the Subjective history.      She is ill today- cough-see above      OBJECTIVE:                                                    Vital signs: Blood pressure 108/72, pulse 100, temperature 97.7  F (36.5  C), temperature source Temporal, resp. rate 14, height 1.568 m (5' 1.75\"), weight 74.4 kg (164 lb), last menstrual period 2019, SpO2 97 %, not currently breastfeeding.        HEENT is normal.      Neck is supple, mobile, no adenoapthy or masses palpable. Normal range of motion noted.     Chest is clear to auscultation. No wheezes, rales or rhonchi heard.  cardiac exam is normal with s1, s2, no murmurs or adventitious sounds.Normal rate and rhythm is heard.     Abdomen is soft,  nondistended, No masses felt.No HSM. No guarding or rigidity or rebound noted. Palpation reveals  no    tenderness   Normal bowel sounds heard.     Pelvic exam:  This exam was deferred to the operating room    Rapid strep test is negative  The rapid flu test result is negative             ASSESSMENT/PLAN:                                                        1.  34-year-old female  2 para 2003 who has possible " endometrial polyps noted on saline hysterography and she was advised to have these removed with hysteroscopy dilatation and curettage.The patient was shown a picture/diagragm  describing the hysteroscopy  D and C procedure and then we went over the consent form together. We discussed risks which include but are not limited to bleeding, infection, possible uterine perforation, possible need for laparoscopy or laparotomy, possible anesthesia risks, and possible inability to dilate the uterus due to anatomical considerations. She signed the consent, witnessed by my nurse. She will have a preop physical with Dr Kendrick  and she knows to be NPO for 8 hours before surgery and to bring a .    2.  She has upper respiratory infection symptoms with colored nasal discharge.  Rapid strep and influenza tests were negative.  See rx for Zithromax. I explained that antibiotics can cause a rash or allergic reaction to develop and so the medication should be stopped if this occurs. Also there is a risk of diarrhea or clostridium difficile pseudomembranous enterocolitis with any antibiotic use so it should be stopped if diarrhea develops and then the clinic should be called so that we have a followup evaluation.                                                                3.  We will postpone the surgery for several weeks so that she can recover from her upper respiratory infection.        Thank you for this consultation.    Copy to  Glendy Kendrick MD  [facility]

## 2019-03-10 LAB
BACTERIA SPEC CULT: NORMAL
SPECIMEN SOURCE: NORMAL

## 2019-03-11 ENCOUNTER — MYC MEDICAL ADVICE (OUTPATIENT)
Dept: FAMILY MEDICINE | Facility: CLINIC | Age: 34
End: 2019-03-11

## 2019-03-11 ENCOUNTER — TELEPHONE (OUTPATIENT)
Dept: OBGYN | Facility: CLINIC | Age: 34
End: 2019-03-11

## 2019-03-11 DIAGNOSIS — Z01.812 PRE-PROCEDURE LAB EXAM: Primary | ICD-10-CM

## 2019-03-11 NOTE — TELEPHONE ENCOUNTER
Type of surgery: Hysteroscoy Dilation and Curettage  Location of surgery: [facility]  Date and time of surgery: 3/25/19@7:30am  Surgeon: Dr. Rodriges  Pre-Op Appt Date: 3/14/19  Post-Op Appt Date: 3/28/19   Packet sent out: Yes-Mychart sent to the patient with instructions  Pre-cert/Authorization completed:  Not Applicable  Date: 3/11/19  Greg Mosqueda MA

## 2019-03-13 NOTE — TELEPHONE ENCOUNTER
I looked and noticed that the patient has not read her [employer] I sent to her on 3/11/19.  I tried to call the patient and there is no answer and her mail box is full.  Patient needs to be recalled to give info for surgery if she does not read her [employer] message.  Greg Mosqueda MA

## 2019-03-14 NOTE — TELEPHONE ENCOUNTER
Tried to reach patient, unable to leave message for patient mailbox is full. Could not leave message for patient.     Divina Haynes, CMA

## 2019-03-15 NOTE — TELEPHONE ENCOUNTER
Spoke with pt, she will log into [facility] to read the instructions and call us if she has any questions. Jinny Stewart, CMA

## 2019-03-21 ENCOUNTER — OFFICE VISIT (OUTPATIENT)
Dept: FAMILY MEDICINE | Facility: CLINIC | Age: 34
End: 2019-03-21
Payer: COMMERCIAL

## 2019-03-21 VITALS
OXYGEN SATURATION: 99 % | HEART RATE: 95 BPM | RESPIRATION RATE: 16 BRPM | TEMPERATURE: 98.5 F | DIASTOLIC BLOOD PRESSURE: 78 MMHG | WEIGHT: 164 LBS | SYSTOLIC BLOOD PRESSURE: 116 MMHG | BODY MASS INDEX: 30.24 KG/M2

## 2019-03-21 DIAGNOSIS — Z01.818 PREOP GENERAL PHYSICAL EXAM: Primary | ICD-10-CM

## 2019-03-21 DIAGNOSIS — N84.0 UTERINE POLYP: ICD-10-CM

## 2019-03-21 PROCEDURE — 99214 OFFICE O/P EST MOD 30 MIN: CPT | Performed by: NURSE PRACTITIONER

## 2019-03-21 NOTE — PROGRESS NOTES
[facility] (formerly [facility] [place]  [id number] [facility]  [place] [address]  [phone]  Dept: [phone]    PRE-OP EVALUATION:  Today's date: 3/21/2019    Gretchen De Jesus (: 1985) presents for pre-operative evaluation assessment as requested by Dr. Rodriges.  She requires evaluation and anesthesia risk assessment prior to undergoing surgery/procedure for treatment of urine polyps .    Proposed Surgery/ Procedure: Hysteroscopy dilatation and curettage  Date of Surgery/ Procedure: 3/25/19  Time of Surgery/ Procedure: [address] [place] [address]/Surgical Facility: [facility]    Primary Physician: Glendy Kendrick  Type of Anesthesia Anticipated: to be determined    Patient has a Health Care Directive or Living Will:  NO    1. NO - Do you have a history of heart attack, stroke, stent, bypass or surgery on an artery in the head, neck, heart or legs?  2. NO - Do you ever have any pain or discomfort in your chest?  3. NO - Do you have a history of  Heart Failure?  4. NO - Are you troubled by shortness of breath when: walking on the level, up a slight hill or at night?  5. NO- DO YOU CURRENTLY HAVE A COLD, BRONCHITIS OR OTHER RESPIRATORY INFECTION?   6. NO - Do you have a cough, shortness of breath or wheezing?  7. NO - Do you sometimes get pains in the calves of your legs when you walk?  8. NO - Do you or anyone in your family have previous history of blood clots?  9. NO - Do you or does anyone in your family have a serious bleeding problem such as prolonged bleeding following surgeries or cuts?  10. YES - HAVE YOU EVER HAD PROBLEMS WITH ANEMIA OR BEEN TOLD TO TAKE IRON PILLS? While pregnant she had to take iron pills, not currently  11. NO - Have you had any abnormal blood loss such as black, tarry or bloody stools, or abnormal vaginal bleeding?  12. NO - Have you ever had a blood transfusion?  13. NO - Have you or any of your relatives ever had problems with anesthesia?  14. NO - Do you have sleep  apnea, excessive snoring or daytime drowsiness?  15. NO - Do you have any prosthetic heart valves?  16. NO - Do you have prosthetic joints?  17. NO - Is there any chance that you may be pregnant?      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 She needs to have her polyps removed before the embryos can be implanted.     MEDICAL HISTORY:     Patient Active Problem List    Diagnosis Date Noted     Other disorder of menstruation and other abnormal bleeding from female genital tract 11/14/2014     Priority: Medium     HSIL (high grade squamous intraepithelial lesion) on Pap smear 01/16/2012     Priority: Medium     9/10/07 HSIL pap smear.  5/12/08 [facility] LEEP  1/19/10 pap NIL  1/19/12 pap NIL  6/28/13 NIL  8/14/14 NIL pap, neg HR HPV. Plan to repeat pap/HPV in 1 year  7/20/15 NIL pap, neg HR HPV. Plan: cotest in 3 yrs  12/27/16 NIL pap, neg HR HPV. Plan: cotest in 3 yrs.  7/13/17 NIL pap in Care Everywhere.  10/31/18 NIL pap, neg HR HPV. Plan: cotest in 5 yrs            CARDIOVASCULAR SCREENING; LDL GOAL LESS THAN 160 10/31/2010     Priority: Medium      Past Medical History:   Diagnosis Date     Abnormal glandular Papanicolaou smear of cervix     Abn. Pap smear (cervix)     Allergic rhinitis, cause unspecified     Allergic rhinitis     Cervical dysplasia      Hypoglycemia      Migraine      Other spontaneous pneumothorax 09/14/06    D/C 09/17/06-left spontaneous pneumothorax     PCOS (polycystic ovarian syndrome)      Past Surgical History:   Procedure Laterality Date     CONIZATION LEEP  05/12/08    LEEP     HC EXCIS PRIMARY GANGLION WRIST  7/28/10     HC EXCISION TENDON SHEATH LESION, HAND/FINGR  09/15/10    left hand     HYSTEROSCOPY  02/01/10     and dilation and curettage and removal of uterine polyp     SURGICAL HISTORY OF -   age 3    ?eye surgery     Current Outpatient Medications   Medication Sig Dispense Refill     desogestrel-ethinyl estradiol  (APRI) 0.15-30 MG-MCG tablet Take 1 tablet by mouth daily       Prenatal Vit-Fe Fumarate-FA (PNV PRENATAL PLUS MULTIVITAMIN) 27-1 MG TABS per tablet Take 1 tablet by mouth daily       Vitamin D, Cholecalciferol, 1000 units CAPS Take 1,000 Int'l Units by mouth 2 times daily       OTC products: lactaid    No Known Allergies   Latex Allergy: NO    Social History     Tobacco Use     Smoking status: Former Smoker     Packs/day: 0.50     Years: 3.00     Pack years: 1.50     Last attempt to quit: 2004     Years since quittin.8     Smokeless tobacco: Never Used     Tobacco comment:  smokes - discussed secondhand smoke   Substance Use Topics     Alcohol use: Yes     Comment: infrequently     History   Drug Use No       REVIEW OF SYSTEMS:   CONSTITUTIONAL: NEGATIVE for fever, chills, change in weight  INTEGUMENTARY/SKIN: NEGATIVE for worrisome rashes, moles or lesions  EYES: NEGATIVE for vision changes or irritation  ENT/MOUTH: NEGATIVE for ear, mouth and throat problems  RESP: NEGATIVE for significant cough or SOB  BREAST: NEGATIVE for masses, tenderness or discharge  CV: NEGATIVE for chest pain, palpitations or peripheral edema  GI: NEGATIVE for nausea, abdominal pain, heartburn, or change in bowel habits  : NEGATIVE for frequency, dysuria, or hematuria  MUSCULOSKELETAL: NEGATIVE for significant arthralgias or myalgia  NEURO: NEGATIVE for weakness, dizziness or paresthesias  ENDOCRINE: NEGATIVE for temperature intolerance, skin/hair changes  HEME: NEGATIVE for bleeding problems  PSYCHIATRIC: NEGATIVE for changes in mood or affect    EXAM:   /78   Pulse 95   Temp 98.5  F (36.9  C) (Oral)   Resp 16   Wt 74.4 kg (164 lb)   LMP 2019   SpO2 99%   BMI 30.24 kg/m      GENERAL APPEARANCE: healthy, alert and no distress     EYES: EOMI, PERRL     HENT: ear canals and TM's normal and nose and mouth without ulcers or lesions     NECK: no adenopathy, no asymmetry, masses, or scars and thyroid normal  to palpation     RESP: lungs clear to auscultation - no rales, rhonchi or wheezes     CV: regular rates and rhythm, normal S1 S2, no S3 or S4 and no murmur, click or rub     ABDOMEN:  soft, nontender, no HSM or masses and bowel sounds normal     MS: extremities normal- no gross deformities noted, no evidence of inflammation in joints, FROM in all extremities.     SKIN: no suspicious lesions or rashes     NEURO: Normal strength and tone, sensory exam grossly normal, mentation intact and speech normal     PSYCH: mentation appears normal. and affect normal/bright     LYMPHATICS: No cervical adenopathy    DIAGNOSTICS:   No labs or EKG required for low risk surgery (cataract, skin procedure, breast biopsy, etc)    Recent Labs   Lab Test 02/27/19  1659 05/30/17  0842   HGB 12.7 13.3    329     --    POTASSIUM 4.0  --    CR 0.69  --         IMPRESSION:   Reason for surgery/procedure: remove uterine polyps   Diagnosis/reason for consult: preop consult    The proposed surgical procedure is considered LOW risk.    REVISED CARDIAC RISK INDEX  The patient has the following serious cardiovascular risks for perioperative complications such as (MI, PE, VFib and 3  AV Block):  No serious cardiac risks  INTERPRETATION: 0 risks: Class I (very low risk - 0.4% complication rate)    The patient has the following additional risks for perioperative complications:  No identified additional risks      ICD-10-CM    1. Preop general physical exam Z01.818    2. Uterine polyp N84.0        RECOMMENDATIONS:       --Patient is on no chronic medications    APPROVAL GIVEN to proceed with proposed procedure, without further diagnostic evaluation      Written by TUAN Katz under the supervision of ANDREA Martines.     Student exam observed as well as completed by provider.  Agree with above documentation. RENEA Ojeda, ANDREA-BC    Signed Electronically by: ANDREA Martines    Copy of this evaluation report is  provided to requesting physician.    [facility] Preop Guidelines    Revised Cardiac Risk Index

## 2019-03-24 ENCOUNTER — ANESTHESIA EVENT (OUTPATIENT)
Dept: SURGERY | Facility: CLINIC | Age: 34
End: 2019-03-24
Payer: COMMERCIAL

## 2019-03-24 DIAGNOSIS — Z01.812 PRE-PROCEDURE LAB EXAM: ICD-10-CM

## 2019-03-24 LAB — B-HCG SERPL-ACNC: <1 IU/L (ref 0–5)

## 2019-03-24 PROCEDURE — 36415 COLL VENOUS BLD VENIPUNCTURE: CPT | Performed by: OBSTETRICS & GYNECOLOGY

## 2019-03-24 PROCEDURE — 84702 CHORIONIC GONADOTROPIN TEST: CPT | Performed by: OBSTETRICS & GYNECOLOGY

## 2019-03-24 ASSESSMENT — LIFESTYLE VARIABLES: TOBACCO_USE: 1

## 2019-03-25 ENCOUNTER — ANESTHESIA (OUTPATIENT)
Dept: SURGERY | Facility: CLINIC | Age: 34
End: 2019-03-25
Payer: COMMERCIAL

## 2019-03-25 ENCOUNTER — SURGERY (OUTPATIENT)
Age: 34
End: 2019-03-25
Payer: COMMERCIAL

## 2019-03-25 ENCOUNTER — HOSPITAL ENCOUNTER (OUTPATIENT)
Facility: CLINIC | Age: 34
Discharge: HOME | End: 2019-03-25
Attending: OBSTETRICS & GYNECOLOGY | Admitting: OBSTETRICS & GYNECOLOGY
Payer: COMMERCIAL

## 2019-03-25 VITALS
SYSTOLIC BLOOD PRESSURE: 111 MMHG | TEMPERATURE: 97.8 F | RESPIRATION RATE: 16 BRPM | OXYGEN SATURATION: 97 % | HEART RATE: 76 BPM | DIASTOLIC BLOOD PRESSURE: 70 MMHG

## 2019-03-25 PROCEDURE — 25000125 ZZHC RX 250: Performed by: NURSE ANESTHETIST, CERTIFIED REGISTERED

## 2019-03-25 PROCEDURE — 37000008 ZZH ANESTHESIA TECHNICAL FEE, 1ST 30 MIN: Performed by: OBSTETRICS & GYNECOLOGY

## 2019-03-25 PROCEDURE — 25000125 ZZHC RX 250: Performed by: OBSTETRICS & GYNECOLOGY

## 2019-03-25 PROCEDURE — 71000027 ZZH RECOVERY PHASE 2 EACH 15 MINS: Performed by: OBSTETRICS & GYNECOLOGY

## 2019-03-25 PROCEDURE — 27210794 ZZH OR GENERAL SUPPLY STERILE: Performed by: OBSTETRICS & GYNECOLOGY

## 2019-03-25 PROCEDURE — 88305 TISSUE EXAM BY PATHOLOGIST: CPT | Mod: 26 | Performed by: OBSTETRICS & GYNECOLOGY

## 2019-03-25 PROCEDURE — 37000009 ZZH ANESTHESIA TECHNICAL FEE, EACH ADDTL 15 MIN: Performed by: OBSTETRICS & GYNECOLOGY

## 2019-03-25 PROCEDURE — 40000305 ZZH STATISTIC PRE PROC ASSESS I: Performed by: OBSTETRICS & GYNECOLOGY

## 2019-03-25 PROCEDURE — 25800030 ZZH RX IP 258 OP 636: Performed by: NURSE ANESTHETIST, CERTIFIED REGISTERED

## 2019-03-25 PROCEDURE — 25000128 H RX IP 250 OP 636: Performed by: OBSTETRICS & GYNECOLOGY

## 2019-03-25 PROCEDURE — 36000058 ZZH SURGERY LEVEL 3 EA 15 ADDTL MIN: Performed by: OBSTETRICS & GYNECOLOGY

## 2019-03-25 PROCEDURE — 58558 HYSTEROSCOPY BIOPSY: CPT | Performed by: OBSTETRICS & GYNECOLOGY

## 2019-03-25 PROCEDURE — 36000056 ZZH SURGERY LEVEL 3 1ST 30 MIN: Performed by: OBSTETRICS & GYNECOLOGY

## 2019-03-25 PROCEDURE — 88305 TISSUE EXAM BY PATHOLOGIST: CPT | Performed by: OBSTETRICS & GYNECOLOGY

## 2019-03-25 PROCEDURE — 25000128 H RX IP 250 OP 636: Performed by: NURSE ANESTHETIST, CERTIFIED REGISTERED

## 2019-03-25 RX ORDER — SODIUM CHLORIDE, SODIUM LACTATE, POTASSIUM CHLORIDE, CALCIUM CHLORIDE 600; 310; 30; 20 MG/100ML; MG/100ML; MG/100ML; MG/100ML
INJECTION, SOLUTION INTRAVENOUS CONTINUOUS
Status: CANCELLED | OUTPATIENT
Start: 2019-03-25

## 2019-03-25 RX ORDER — ONDANSETRON 2 MG/ML
4 INJECTION INTRAMUSCULAR; INTRAVENOUS EVERY 30 MIN PRN
Status: CANCELLED | OUTPATIENT
Start: 2019-03-25

## 2019-03-25 RX ORDER — DIMENHYDRINATE 50 MG/ML
25 INJECTION, SOLUTION INTRAMUSCULAR; INTRAVENOUS
Status: CANCELLED | OUTPATIENT
Start: 2019-03-25

## 2019-03-25 RX ORDER — FENTANYL CITRATE 50 UG/ML
INJECTION, SOLUTION INTRAMUSCULAR; INTRAVENOUS PRN
Status: DISCONTINUED | OUTPATIENT
Start: 2019-03-25 | End: 2019-03-25

## 2019-03-25 RX ORDER — LIDOCAINE HYDROCHLORIDE 20 MG/ML
INJECTION, SOLUTION INFILTRATION; PERINEURAL PRN
Status: DISCONTINUED | OUTPATIENT
Start: 2019-03-25 | End: 2019-03-25

## 2019-03-25 RX ORDER — SODIUM CHLORIDE, SODIUM LACTATE, POTASSIUM CHLORIDE, CALCIUM CHLORIDE 600; 310; 30; 20 MG/100ML; MG/100ML; MG/100ML; MG/100ML
INJECTION, SOLUTION INTRAVENOUS CONTINUOUS
Status: DISCONTINUED | OUTPATIENT
Start: 2019-03-25 | End: 2019-03-25 | Stop reason: HOSPADM

## 2019-03-25 RX ORDER — LIDOCAINE 40 MG/G
CREAM TOPICAL
Status: DISCONTINUED | OUTPATIENT
Start: 2019-03-25 | End: 2019-03-25 | Stop reason: HOSPADM

## 2019-03-25 RX ORDER — ONDANSETRON 4 MG/1
4 TABLET, ORALLY DISINTEGRATING ORAL EVERY 30 MIN PRN
Status: CANCELLED | OUTPATIENT
Start: 2019-03-25

## 2019-03-25 RX ORDER — PROPOFOL 10 MG/ML
INJECTION, EMULSION INTRAVENOUS PRN
Status: DISCONTINUED | OUTPATIENT
Start: 2019-03-25 | End: 2019-03-25

## 2019-03-25 RX ORDER — MEPERIDINE HYDROCHLORIDE 50 MG/ML
12.5 INJECTION INTRAMUSCULAR; INTRAVENOUS; SUBCUTANEOUS
Status: CANCELLED | OUTPATIENT
Start: 2019-03-25

## 2019-03-25 RX ORDER — CEFAZOLIN SODIUM 2 G/100ML
2 INJECTION, SOLUTION INTRAVENOUS
Status: COMPLETED | OUTPATIENT
Start: 2019-03-25 | End: 2019-03-25

## 2019-03-25 RX ORDER — NALOXONE HYDROCHLORIDE 0.4 MG/ML
.1-.4 INJECTION, SOLUTION INTRAMUSCULAR; INTRAVENOUS; SUBCUTANEOUS
Status: CANCELLED | OUTPATIENT
Start: 2019-03-25 | End: 2019-03-26

## 2019-03-25 RX ORDER — CEFAZOLIN SODIUM 1 G/3ML
1 INJECTION, POWDER, FOR SOLUTION INTRAMUSCULAR; INTRAVENOUS SEE ADMIN INSTRUCTIONS
Status: DISCONTINUED | OUTPATIENT
Start: 2019-03-25 | End: 2019-03-25 | Stop reason: HOSPADM

## 2019-03-25 RX ORDER — KETOROLAC TROMETHAMINE 30 MG/ML
INJECTION, SOLUTION INTRAMUSCULAR; INTRAVENOUS PRN
Status: DISCONTINUED | OUTPATIENT
Start: 2019-03-25 | End: 2019-03-25

## 2019-03-25 RX ORDER — PROPOFOL 10 MG/ML
INJECTION, EMULSION INTRAVENOUS CONTINUOUS PRN
Status: DISCONTINUED | OUTPATIENT
Start: 2019-03-25 | End: 2019-03-25

## 2019-03-25 RX ORDER — HYDROMORPHONE HYDROCHLORIDE 1 MG/ML
.3-.5 INJECTION, SOLUTION INTRAMUSCULAR; INTRAVENOUS; SUBCUTANEOUS EVERY 10 MIN PRN
Status: CANCELLED | OUTPATIENT
Start: 2019-03-25

## 2019-03-25 RX ORDER — FENTANYL CITRATE 50 UG/ML
25-50 INJECTION, SOLUTION INTRAMUSCULAR; INTRAVENOUS
Status: CANCELLED | OUTPATIENT
Start: 2019-03-25

## 2019-03-25 RX ADMIN — SODIUM CHLORIDE, POTASSIUM CHLORIDE, SODIUM LACTATE AND CALCIUM CHLORIDE: 600; 310; 30; 20 INJECTION, SOLUTION INTRAVENOUS at 06:57

## 2019-03-25 RX ADMIN — KETOROLAC TROMETHAMINE 30 MG: 30 INJECTION, SOLUTION INTRAMUSCULAR at 07:54

## 2019-03-25 RX ADMIN — CEFAZOLIN SODIUM 2 G: 2 INJECTION, SOLUTION INTRAVENOUS at 07:28

## 2019-03-25 RX ADMIN — FENTANYL CITRATE 50 MCG: 50 INJECTION, SOLUTION INTRAMUSCULAR; INTRAVENOUS at 07:29

## 2019-03-25 RX ADMIN — MIDAZOLAM 2 MG: 1 INJECTION INTRAMUSCULAR; INTRAVENOUS at 07:24

## 2019-03-25 RX ADMIN — FENTANYL CITRATE 50 MCG: 50 INJECTION, SOLUTION INTRAMUSCULAR; INTRAVENOUS at 07:45

## 2019-03-25 RX ADMIN — PROPOFOL 20 MG: 10 INJECTION, EMULSION INTRAVENOUS at 07:28

## 2019-03-25 RX ADMIN — PROPOFOL 150 MCG/KG/MIN: 10 INJECTION, EMULSION INTRAVENOUS at 07:28

## 2019-03-25 RX ADMIN — SILVER NITRATE APPLICATORS 1 APPLICATOR: 25; 75 STICK TOPICAL at 07:53

## 2019-03-25 RX ADMIN — LIDOCAINE HYDROCHLORIDE 1 ML: 10 INJECTION, SOLUTION EPIDURAL; INFILTRATION; INTRACAUDAL; PERINEURAL at 06:57

## 2019-03-25 RX ADMIN — LIDOCAINE HYDROCHLORIDE 60 MG: 20 INJECTION, SOLUTION INFILTRATION; PERINEURAL at 07:28

## 2019-03-25 RX ADMIN — LIDOCAINE HYDROCHLORIDE 20 MG: 20 INJECTION, SOLUTION INFILTRATION; PERINEURAL at 07:30

## 2019-03-25 NOTE — ANESTHESIA PREPROCEDURE EVALUATION
Anesthesia Pre-Procedure Evaluation    Patient: Gretchen De Jesus   MRN: [id number] : 1985          Preoperative Diagnosis: Endometrial polyps    Procedure(s):  Hysteroscopy dilatation and curettage    Past Medical History:   Diagnosis Date     Abnormal glandular Papanicolaou smear of cervix     Abn. Pap smear (cervix)     Allergic rhinitis, cause unspecified     Allergic rhinitis     Cervical dysplasia      Hypoglycemia      Migraine      Other spontaneous pneumothorax 06    D/C 06-left spontaneous pneumothorax     PCOS (polycystic ovarian syndrome)      Past Surgical History:   Procedure Laterality Date     CONIZATION LEEP  08    LEEP     HC EXCIS PRIMARY GANGLION WRIST  7/28/10     HC EXCISION TENDON SHEATH LESION, HAND/FINGR  09/15/10    left hand     HYSTEROSCOPY  02/01/10     and dilation and curettage and removal of uterine polyp     SURGICAL HISTORY OF -   age 3    ?eye surgery       Anesthesia Evaluation     . Pt has had prior anesthetic. Type: MAC           ROS/MED HX    ENT/Pulmonary:     (+)tobacco use, Past use , . Other pulmonary disease Hx spont pneumothorax .    Neurologic:     (+)migraines,     Cardiovascular:  - neg cardiovascular ROS       METS/Exercise Tolerance:  >4 METS   Hematologic:         Musculoskeletal:  - neg musculoskeletal ROS       GI/Hepatic:  - neg GI/hepatic ROS       Renal/Genitourinary:     (+) Other Renal/ Genitourinary, Polycystic ovarian syndrome      Endo:  - neg endo ROS       Psychiatric:  - neg psychiatric ROS       Infectious Disease:  - neg infectious disease ROS       Malignancy:      - no malignancy   Other:    - neg other ROS                      Physical Exam  Normal systems: cardiovascular, pulmonary and dental    Airway   Mallampati: I  TM distance: >3 FB  Neck ROM: full    Dental     Cardiovascular   Rhythm and rate: regular and normal      Pulmonary    breath sounds clear to auscultation            Lab Results   Component Value  "Date    WBC 8.5 02/27/2019    HGB 12.7 02/27/2019    HCT 39.2 02/27/2019     02/27/2019     02/27/2019    POTASSIUM 4.0 02/27/2019    CHLORIDE 107 02/27/2019    CO2 25 02/27/2019    BUN 16 02/27/2019    CR 0.69 02/27/2019    GLC 89 02/27/2019    EMILE 8.8 02/27/2019    ALBUMIN 3.9 02/27/2019    PROTTOTAL 7.6 02/27/2019    ALT 43 02/27/2019    AST 32 02/27/2019    ALKPHOS 84 02/27/2019    BILITOTAL 0.2 02/27/2019    TSH 1.08 02/27/2019    T4 1.03 02/27/2019    HCG Negative 03/25/2014       Preop Vitals  BP Readings from Last 3 Encounters:   03/21/19 116/78   03/08/19 108/72   01/25/19 114/64    Pulse Readings from Last 3 Encounters:   03/21/19 95   03/08/19 100   01/25/19 107      Resp Readings from Last 3 Encounters:   03/21/19 16   03/08/19 14   10/31/18 16    SpO2 Readings from Last 3 Encounters:   03/21/19 99%   03/08/19 97%   01/25/19 97%      Temp Readings from Last 1 Encounters:   03/21/19 98.5  F (36.9  C) (Oral)    Ht Readings from Last 1 Encounters:   03/08/19 1.568 m (5' 1.75\")      Wt Readings from Last 1 Encounters:   03/21/19 74.4 kg (164 lb)    Estimated body mass index is 30.24 kg/m  as calculated from the following:    Height as of 3/8/19: 1.568 m (5' 1.75\").    Weight as of 3/21/19: 74.4 kg (164 lb).       Anesthesia Plan      History & Physical Review  History and physical reviewed and following examination; no interval change.    ASA Status:  2 .    NPO Status:  > 8 hours    Plan for MAC with Intravenous and Propofol induction. Maintenance will be TIVA.  Reason for MAC:  Deep or markedly invasive procedure (G8)         Postoperative Care  Postoperative pain management:  IV analgesics and Oral pain medications.      Consents  Anesthetic plan, risks, benefits and alternatives discussed with:  Patient..                 RENEA Wong CRNA  "

## 2019-03-25 NOTE — ANESTHESIA CARE TRANSFER NOTE
Patient: Gretchen De Jesus    Procedure(s):  Hysteroscopy, Dilatation and Curettage    Diagnosis: Endometrial polyps  Diagnosis Additional Information: No value filed.    Anesthesia Type:   MAC     Note:  Airway :Room Air  Patient transferred to:Phase II  Handoff Report: Identifed the Patient, Identified the Reponsible Provider, Reviewed the pertinent medical history, Discussed the surgical course, Reviewed Intra-OP anesthesia mangement and issues during anesthesia, Set expectations for post-procedure period and Allowed opportunity for questions and acknowledgement of understanding      Vitals: (Last set prior to Anesthesia Care Transfer)    CRNA VITALS  3/25/2019 0729 - 3/25/2019 0805      3/25/2019             Pulse:  82    SpO2:  100 %                Electronically Signed By: RENEA Wong CRNA  March 25, 2019  8:05 AM

## 2019-03-25 NOTE — DISCHARGE INSTRUCTIONS
Discharge instructions for Dr. Rodriges:         You will need to schedule a post operative appointment  Within the next week.. Please call [phone] or [phone]  to speak to Dr Rodriges's nurse  or else call the main appointments line at [phone] if she is not available.         If you have unusually heavy vaginal bleeding, severe nausea with vomiting, or increased pain, or fever, call us at that same number to be seen earlier, or go to the emergency room if after hours or we are unavailable.          you should avoid intercourse for 1   week   after surgery.           you should not drive for 1 day   after surgery           you should not shower today but may resume showering tomorrow after the effects of anesthesia wear off.  Do not take a bath for 1  week   after surgery.    If you have questions do not hesitate to call the office at above number. Thank you.         Eamon Rodriges MD, FACOG, FAAFP              , OB/GYN  Department.           [facility]  Same-Day Surgery   Adult Discharge Orders & Instructions     For 24 hours after surgery    1. Get plenty of rest.  A responsible adult must stay with you for at least 24 hours after you leave the hospital.   2. Do not drive or use heavy equipment.  If you have weakness or tingling, don't drive or use heavy equipment until this feeling goes away.  3. Do not drink alcohol.  4. Avoid strenuous or risky activities.  Ask for help when climbing stairs.   5. You may feel lightheaded.  IF so, sit for a few minutes before standing.  Have someone help you get up.   6. If you have nausea (feel sick to your stomach): Drink only clear liquids such as apple juice, ginger ale, broth or 7-Up.  Rest may also help.  Be sure to drink enough fluids.  Move to a regular diet as you feel able.  7. You may have a slight fever. Call the doctor if your fever is over 100 F (37.7 C) (taken under the tongue) or lasts longer than 24  hours.  8. You may have a dry mouth, a sore throat, muscle aches or trouble sleeping.  These should go away after 24 hours.  9. Do not make important or legal decisions.   Call your doctor for any of the followin.  Signs of infection (fever, growing tenderness at the surgery site, a large amount of drainage or bleeding, severe pain, foul-smelling drainage, redness, swelling).    2. It has been over 8 to 10 hours since surgery and you are still not able to urinate (pass water).    3.  Headache for over 24 hours.

## 2019-03-25 NOTE — OP NOTE
Surgeon: Antelmo  Preoperative diagnosis: possible endometrial polyps  Postoperative diagnosis: thickened endometrium  Procedure: Hysteroscopy, dilatation and curettage   Anesthesia: Monitored Anesthesia Care(MAC)     Patient profile: 34 year old female with saline hysterography that suggested possible endometrial polyps.    Operative findings: The uterus was about 6 weeks size and did  descend somewhat well in the vaginal vault-she would   be a candidate for vaginal hysterectomy  If laparoscopically assisted.. The hysteroscopy revealed no polyps or masses  But there was a thickened endometrium posteriorly in the endometrial cavity. It was curetted gently and removed.      OPERATIVE DESCRIPTION:  After the establishment of satisfactory  monitored    anesthesia, the patient was prepped and draped in the usual fashion for   vaginal surgery and the bladder was drained of urine.  An open-sided   speculum was inserted and the cervix was grasped anteriorly with a   single-tooth tenaculum and dilated with Hanks dilators and then   hysteroscopy was performed using 150 cc of normal saline and 130 cc was   recovered.  The endometrium was curetted sharply.  Hysteroscopy revealed findings as listed above.   Curettings were sent for pathology.  Then, the tenaculum was removed and the puncture sites were cauterized with silver nitrate.  The speculum was removed and then   after that was completed, the final sponge, needle and instrument counts   were reported to me as correct and the patient was taken to the recovery   room in good condition without complications.  Estimated blood loss was   10cc.       I will plan to call her in the next several days to check on her postoperative progress.She will be instructed to recheck in 1-2 weeks with me- and recheck acutely if she experiences heavy vaginal bleeding greater than menses or fever or increased pain or vomiting.    ALMA DELIA Rodriges MD

## 2019-03-25 NOTE — ANESTHESIA POSTPROCEDURE EVALUATION
Patient: Gretchen De Jesus    Procedure(s):  Hysteroscopy, Dilatation and Curettage    Diagnosis:Endometrial polyps  Diagnosis Additional Information: No value filed.    Anesthesia Type:  MAC    Note:  Anesthesia Post Evaluation    Patient location during evaluation: Phase 2  Patient participation: Able to fully participate in evaluation  Level of consciousness: awake and alert  Pain management: adequate  Airway patency: patent  Cardiovascular status: acceptable  Respiratory status: acceptable  Hydration status: acceptable  PONV: none     Anesthetic complications: None          Last vitals:  Vitals:    03/25/19 0641   BP: 111/66   Resp: 16   Temp: 97.8  F (36.6  C)   SpO2: 98%         Electronically Signed By: RENEA Wong CRNA  March 25, 2019  8:08 AM

## 2019-03-26 LAB — COPATH REPORT: NORMAL

## 2019-03-27 NOTE — RESULT ENCOUNTER NOTE
Sanaz/Greg Herndon,Please inform Gretchen/ or caretaker  that this result(s) is/are normal.  The pathology report of the lining that I sent and was normal.  I did not see any polyps just a thickened lining.  She does not need to come in for a postop check if she is feeling well.  Thanks. ALMA DELIA Rodriges MD

## 2019-03-27 NOTE — OR NURSING
"[facility] Same Day Surgery  Discharge Call Back  Gretchen De Jesus  1985  MRN: [id number]  Home: [phone] (home) [phone] (work)  PCP: Glendy Kendrick    We are calling to see how you're doing since your surgery/procedure with us?   Comments: \"I'm doing quite well\"  Clinical Questions  1. Have you had time to look at your discharge instructions? Do you have any questions in regards to the instructions?   Comment: yes/no  2. Do you feel your pain is being controlled with the regimen the surgeon sent you home on? (ie: prescription medications, over the counter pain medications, ice packs)   Comments: yes, no issues  3. Have you noticed any drainage on your dressing? Do you know what to do if you have bleeding as a result of your procedure?   Comments: no/yes  4. Have you had any nausea/vomiting? Do you know how to treat this?   Comment: no/yes  5. Have you had any signs/symptoms of infection? (ie: fever, swelling, heat, drainage or redness) Do you know what to do if you have?   Comment: no/yes  6. Do you have a follow up appointment made with your surgeon? Do you have a number to contact them at if you need it?   Comment: yes/yes  Retained Foreign Object (ROLO, Hemovac, Penrose, Wound Packing, Vaginal Packing, Nasal Splints, Urethral Stents, Trejo Catheter)  1. Do you still have NA in place?   2. If the item is still in place, can you review the plan for removal with me? NA      You may be randomly selected to fill out a [facility] [facility] survey. We would appreciate you taking the time to fill this out. It is important to us if you would answer all of the questions on the survey.            "

## 2019-04-02 ENCOUNTER — MYC MEDICAL ADVICE (OUTPATIENT)
Dept: FAMILY MEDICINE | Facility: CLINIC | Age: 34
End: 2019-04-02

## 2019-04-05 ENCOUNTER — MEDICAL CORRESPONDENCE (OUTPATIENT)
Dept: HEALTH INFORMATION MANAGEMENT | Facility: CLINIC | Age: 34
End: 2019-04-05

## 2019-04-05 DIAGNOSIS — Z36.3 ENCOUNTER FOR ROUTINE SCREENING FOR MALFORMATION USING ULTRASONICS: Primary | ICD-10-CM

## 2019-04-05 LAB
AMPHETAMINES UR QL: NOT DETECTED NG/ML
BARBITURATES UR QL SCN: NOT DETECTED NG/ML
BENZODIAZ UR QL SCN: NOT DETECTED NG/ML
BUPRENORPHINE UR QL: NOT DETECTED NG/ML
CANNABINOIDS UR QL: NOT DETECTED NG/ML
COCAINE UR QL SCN: NOT DETECTED NG/ML
D-METHAMPHET UR QL: NOT DETECTED NG/ML
METHADONE UR QL SCN: NOT DETECTED NG/ML
OPIATES UR QL SCN: NOT DETECTED NG/ML
OXYCODONE UR QL SCN: NOT DETECTED NG/ML
PCP UR QL SCN: NOT DETECTED NG/ML
PROPOXYPH UR QL: NOT DETECTED NG/ML
TRICYCLICS UR QL SCN: NOT DETECTED NG/ML

## 2019-04-05 PROCEDURE — 80306 DRUG TEST PRSMV INSTRMNT: CPT | Performed by: SPECIALIST

## 2019-04-19 ENCOUNTER — TELEPHONE (OUTPATIENT)
Dept: FAMILY MEDICINE | Facility: CLINIC | Age: 34
End: 2019-04-19

## 2019-04-19 NOTE — TELEPHONE ENCOUNTER
Called Erica back and they are just hoping to have had form refaxed since they have not received it. They will fax a new form to be filled out by Dr. Rodriges, understanding he is not back in clinic until 5/2/19. Will try to locate original form to refax. Jinny Stewart CMA

## 2019-04-19 NOTE — TELEPHONE ENCOUNTER
Reason for Call:  Other call back/fax form     Detailed comments: Erica from [facility] calling to check on the status of the OB Clearance form that they faxed to Dr Rordiges. Please call her back at [phone] Ext. [phone] or refax completed form to [phone] Attn: Erica. Was advised Dr Rodriges is not in clinic until 5.2. Erica asking if Dr Rodriges's nurse could call her back as soon as possible.     Phone Number Patient can be reached at:     Best Time:     Can we leave a detailed message on this number? Not Applicable    Call taken on 4/19/2019 at 12:29 PM by Carolyne Dahl

## 2019-04-26 DIAGNOSIS — Z31.83 ENCOUNTER FOR ASSISTED REPRODUCTIVE FERTILITY CYCLE: ICD-10-CM

## 2019-04-26 LAB — ESTRADIOL SERPL-MCNC: 22 PG/ML

## 2019-04-26 PROCEDURE — 36415 COLL VENOUS BLD VENIPUNCTURE: CPT | Performed by: INTERNAL MEDICINE

## 2019-04-26 PROCEDURE — 82670 ASSAY OF TOTAL ESTRADIOL: CPT | Performed by: INTERNAL MEDICINE

## 2019-05-01 NOTE — TELEPHONE ENCOUNTER
Called and left message for the patient to call the clinic.  Need to verify what medications she is currently on.  I also called and left a message for [facility] to call me to verify all info needed.  Greg Mosqueda MA

## 2019-05-01 NOTE — TELEPHONE ENCOUNTER
I filled out form and printed pertinent info from the patient record.  There was to much to fax so I mailed it to the address on the form after talking to Khloe with the Clinic that was requesting the info. I also sent the form to medical Records to be scanned into the patients chart. No further steps need to be taken at this time

## 2019-05-01 NOTE — TELEPHONE ENCOUNTER
Pt calling back. She states she is on many medications. Her list is at home. She will MyChart the list to you.  Thank you,  Jodi Lane- Patient Representative

## 2019-05-09 ENCOUNTER — ANCILLARY PROCEDURE (OUTPATIENT)
Dept: ULTRASOUND IMAGING | Facility: CLINIC | Age: 34
End: 2019-05-09
Attending: SPECIALIST
Payer: COMMERCIAL

## 2019-05-09 DIAGNOSIS — Z31.83 ENCOUNTER FOR ASSISTED REPRODUCTIVE FERTILITY CYCLE: ICD-10-CM

## 2019-05-09 DIAGNOSIS — Z31.83 ENCOUNTER FOR ASSISTED REPRODUCTIVE FERTILITY PROCEDURE CYCLE: Primary | ICD-10-CM

## 2019-05-09 LAB — ESTRADIOL SERPL-MCNC: 56 PG/ML

## 2019-05-09 PROCEDURE — 76830 TRANSVAGINAL US NON-OB: CPT

## 2019-05-09 PROCEDURE — 82670 ASSAY OF TOTAL ESTRADIOL: CPT | Performed by: SPECIALIST

## 2019-05-09 PROCEDURE — 36415 COLL VENOUS BLD VENIPUNCTURE: CPT | Performed by: SPECIALIST

## 2019-05-16 DIAGNOSIS — Z31.83 ENCOUNTER FOR ASSISTED REPRODUCTIVE FERTILITY PROCEDURE CYCLE: ICD-10-CM

## 2019-05-16 PROCEDURE — 80306 DRUG TEST PRSMV INSTRMNT: CPT | Performed by: SPECIALIST

## 2019-05-22 ENCOUNTER — ANCILLARY PROCEDURE (OUTPATIENT)
Dept: ULTRASOUND IMAGING | Facility: CLINIC | Age: 34
End: 2019-05-22
Attending: SPECIALIST
Payer: COMMERCIAL

## 2019-05-22 DIAGNOSIS — Z31.83 ENCOUNTER FOR ASSISTED REPRODUCTIVE FERTILITY CYCLE: ICD-10-CM

## 2019-05-22 LAB
ESTRADIOL SERPL-MCNC: 245 PG/ML
PROGEST SERPL-MCNC: 0.3 NG/ML

## 2019-05-22 PROCEDURE — 76830 TRANSVAGINAL US NON-OB: CPT

## 2019-05-22 PROCEDURE — 36415 COLL VENOUS BLD VENIPUNCTURE: CPT | Performed by: SPECIALIST

## 2019-05-22 PROCEDURE — 84144 ASSAY OF PROGESTERONE: CPT | Performed by: SPECIALIST

## 2019-05-22 PROCEDURE — 82670 ASSAY OF TOTAL ESTRADIOL: CPT | Performed by: SPECIALIST

## 2019-05-29 ENCOUNTER — ANCILLARY PROCEDURE (OUTPATIENT)
Dept: ULTRASOUND IMAGING | Facility: CLINIC | Age: 34
End: 2019-05-29
Attending: SPECIALIST
Payer: COMMERCIAL

## 2019-05-29 DIAGNOSIS — N97.9 INFERTILITY, FEMALE: ICD-10-CM

## 2019-05-29 DIAGNOSIS — Z31.83 ENCOUNTER FOR ASSISTED REPRODUCTIVE FERTILITY PROCEDURE CYCLE: ICD-10-CM

## 2019-05-29 DIAGNOSIS — Z31.83 ENCOUNTER FOR ASSISTED REPRODUCTIVE FERTILITY CYCLE: ICD-10-CM

## 2019-05-29 DIAGNOSIS — Z31.83 IN VITRO FERTILIZATION: ICD-10-CM

## 2019-05-29 LAB
ESTRADIOL SERPL-MCNC: 218 PG/ML
PROGEST SERPL-MCNC: 19.6 NG/ML

## 2019-05-29 PROCEDURE — 76830 TRANSVAGINAL US NON-OB: CPT

## 2019-05-29 PROCEDURE — 84144 ASSAY OF PROGESTERONE: CPT | Performed by: SPECIALIST

## 2019-05-29 PROCEDURE — 36415 COLL VENOUS BLD VENIPUNCTURE: CPT | Performed by: SPECIALIST

## 2019-05-29 PROCEDURE — 82670 ASSAY OF TOTAL ESTRADIOL: CPT | Performed by: SPECIALIST

## 2019-06-12 ENCOUNTER — TELEPHONE (OUTPATIENT)
Dept: FAMILY MEDICINE | Facility: CLINIC | Age: 34
End: 2019-06-12

## 2019-06-12 DIAGNOSIS — Z31.83 ENCOUNTER FOR ASSISTED REPRODUCTIVE FERTILITY CYCLE: ICD-10-CM

## 2019-06-12 LAB
B-HCG SERPL-ACNC: 82 IU/L (ref 0–5)
ESTRADIOL SERPL-MCNC: 232 PG/ML
PROGEST SERPL-MCNC: 42.6 NG/ML

## 2019-06-12 PROCEDURE — 84144 ASSAY OF PROGESTERONE: CPT | Performed by: SPECIALIST

## 2019-06-12 PROCEDURE — 84702 CHORIONIC GONADOTROPIN TEST: CPT | Performed by: SPECIALIST

## 2019-06-12 PROCEDURE — 36415 COLL VENOUS BLD VENIPUNCTURE: CPT | Performed by: SPECIALIST

## 2019-06-12 PROCEDURE — 82670 ASSAY OF TOTAL ESTRADIOL: CPT | Performed by: SPECIALIST

## 2019-06-12 NOTE — TELEPHONE ENCOUNTER
Results of labs done on 6-12 at 9 am  Caller informed that calls received after 3pm may not be returned same day.  Thank you

## 2019-06-14 DIAGNOSIS — Z31.83 ENCOUNTER FOR ASSISTED REPRODUCTIVE FERTILITY CYCLE: ICD-10-CM

## 2019-06-14 DIAGNOSIS — Z36.3 ENCOUNTER FOR ROUTINE SCREENING FOR MALFORMATION USING ULTRASONICS: ICD-10-CM

## 2019-06-14 LAB
AMPHETAMINES UR QL: NOT DETECTED NG/ML
B-HCG SERPL-ACNC: 99 IU/L (ref 0–5)
BARBITURATES UR QL SCN: NOT DETECTED NG/ML
BENZODIAZ UR QL SCN: ABNORMAL NG/ML
BUPRENORPHINE UR QL: NOT DETECTED NG/ML
CANNABINOIDS UR QL: NOT DETECTED NG/ML
COCAINE UR QL SCN: NOT DETECTED NG/ML
D-METHAMPHET UR QL: NOT DETECTED NG/ML
METHADONE UR QL SCN: NOT DETECTED NG/ML
OPIATES UR QL SCN: NOT DETECTED NG/ML
OXYCODONE UR QL SCN: NOT DETECTED NG/ML
PCP UR QL SCN: NOT DETECTED NG/ML
PROPOXYPH UR QL: NOT DETECTED NG/ML
TRICYCLICS UR QL SCN: NOT DETECTED NG/ML

## 2019-06-14 PROCEDURE — 80346 BENZODIAZEPINES1-12: CPT | Mod: 90 | Performed by: SPECIALIST

## 2019-06-14 PROCEDURE — 80306 DRUG TEST PRSMV INSTRMNT: CPT | Performed by: SPECIALIST

## 2019-06-14 PROCEDURE — 84702 CHORIONIC GONADOTROPIN TEST: CPT | Performed by: SPECIALIST

## 2019-06-14 PROCEDURE — 99000 SPECIMEN HANDLING OFFICE-LAB: CPT | Performed by: SPECIALIST

## 2019-06-14 PROCEDURE — 36415 COLL VENOUS BLD VENIPUNCTURE: CPT | Performed by: SPECIALIST

## 2019-06-17 DIAGNOSIS — Z31.83 ENCOUNTER FOR ASSISTED REPRODUCTIVE FERTILITY CYCLE: ICD-10-CM

## 2019-06-17 LAB — B-HCG SERPL-ACNC: 43 IU/L (ref 0–5)

## 2019-06-17 PROCEDURE — 36415 COLL VENOUS BLD VENIPUNCTURE: CPT | Performed by: SPECIALIST

## 2019-06-17 PROCEDURE — 84702 CHORIONIC GONADOTROPIN TEST: CPT | Performed by: SPECIALIST

## 2019-06-23 LAB
A-OH ALPRAZ UR CFM-MCNC: NEGATIVE NG/ML
ALPRAZ UR CFM-MCNC: NEGATIVE NG/ML
LORAZEPAM UR CFM-MCNC: NEGATIVE NG/ML
NORDIAZEPAM UR CFM-MCNC: NEGATIVE NG/ML
OXAZEPAM UR CFM-MCNC: 110 NG/ML
TEMAZEPAM UR CFM-MCNC: 100 NG/ML

## 2019-06-24 ENCOUNTER — TELEPHONE (OUTPATIENT)
Dept: FAMILY MEDICINE | Facility: CLINIC | Age: 34
End: 2019-06-24

## 2019-06-24 DIAGNOSIS — O02.81 BIOCHEMICAL PREGNANCY: ICD-10-CM

## 2019-06-24 LAB — B-HCG SERPL-ACNC: <1 IU/L (ref 0–5)

## 2019-06-24 PROCEDURE — 36415 COLL VENOUS BLD VENIPUNCTURE: CPT | Performed by: SPECIALIST

## 2019-06-24 PROCEDURE — 84702 CHORIONIC GONADOTROPIN TEST: CPT | Performed by: SPECIALIST

## 2019-06-24 NOTE — TELEPHONE ENCOUNTER
Reason for Call:  Same Day Appointment, Requested Provider:  Glendy Kendrick M.D.    PCP: Glendy Kendrick    Reason for visit:   Appointment Request From: Gretchen De Jesus      With Provider: Glendy Kendrick MD [[facility]]      Preferred Date Range: 6/24/2019 - 8/30/2019      Preferred Times: Any time      Reason for visit: Request an Appointment      Comments:   For the last 10 years I ve been experiencing low/ no sexual desire. When it comes to my sex drive, I feel nothing. I don't think about it. I am not even physically attracted to my  or anyone. Sex is good for me once we get going, but until then it's not something I want or need. I'm very frustrated and concerned because it s negatively impacting my relationship with my . I know there has got to be something wrong with me that can be fixed. What can I do?         Duration of symptoms:      Have you been treated for this in the past?    Additional comments:  Please see message above. Patient requesting during a time that is not avail.     Can we leave a detailed message on this number? Not Applicable    Phone number patient can be reached at: Home number on file [phone] (home)    Best Time:      Call taken on 6/24/2019 at 8:34 AM by Emily Mares

## 2019-08-20 ENCOUNTER — OFFICE VISIT (OUTPATIENT)
Dept: FAMILY MEDICINE | Facility: CLINIC | Age: 34
End: 2019-08-20
Payer: COMMERCIAL

## 2019-08-20 VITALS
TEMPERATURE: 97.6 F | SYSTOLIC BLOOD PRESSURE: 130 MMHG | BODY MASS INDEX: 31.35 KG/M2 | WEIGHT: 170 LBS | OXYGEN SATURATION: 99 % | HEART RATE: 129 BPM | RESPIRATION RATE: 16 BRPM | DIASTOLIC BLOOD PRESSURE: 82 MMHG

## 2019-08-20 DIAGNOSIS — E55.9 VITAMIN D DEFICIENCY: ICD-10-CM

## 2019-08-20 DIAGNOSIS — R68.82 DECREASED LIBIDO: Primary | ICD-10-CM

## 2019-08-20 LAB
ESTRADIOL SERPL-MCNC: 160 PG/ML
FERRITIN SERPL-MCNC: 12 NG/ML (ref 12–150)
PROGEST SERPL-MCNC: 10.6 NG/ML
TSH SERPL DL<=0.005 MIU/L-ACNC: 0.64 MU/L (ref 0.4–4)

## 2019-08-20 PROCEDURE — 84270 ASSAY OF SEX HORMONE GLOBUL: CPT | Performed by: FAMILY MEDICINE

## 2019-08-20 PROCEDURE — 99213 OFFICE O/P EST LOW 20 MIN: CPT | Performed by: FAMILY MEDICINE

## 2019-08-20 PROCEDURE — 82670 ASSAY OF TOTAL ESTRADIOL: CPT | Performed by: FAMILY MEDICINE

## 2019-08-20 PROCEDURE — 82728 ASSAY OF FERRITIN: CPT | Performed by: FAMILY MEDICINE

## 2019-08-20 PROCEDURE — 82306 VITAMIN D 25 HYDROXY: CPT | Performed by: FAMILY MEDICINE

## 2019-08-20 PROCEDURE — 36415 COLL VENOUS BLD VENIPUNCTURE: CPT | Performed by: FAMILY MEDICINE

## 2019-08-20 PROCEDURE — 84443 ASSAY THYROID STIM HORMONE: CPT | Performed by: FAMILY MEDICINE

## 2019-08-20 PROCEDURE — 84144 ASSAY OF PROGESTERONE: CPT | Performed by: FAMILY MEDICINE

## 2019-08-20 PROCEDURE — 84403 ASSAY OF TOTAL TESTOSTERONE: CPT | Performed by: FAMILY MEDICINE

## 2019-08-20 ASSESSMENT — PAIN SCALES - GENERAL: PAINLEVEL: NO PAIN (0)

## 2019-08-20 NOTE — PROGRESS NOTES
"Subjective     Gretchen De Jesus is a 34 year old female who presents to clinic today for the following health issues:    HPI   Chief Complaint   Patient presents with     Sexual Problem     states she has low libido, going on for 10 years.       She has no desire for sex at all.  She is in a great relationship with her  of 15 years, they are happy together but she is not attracted by him or any other man. She states she is able to engage in and enjoy sex and achieve orgasm, but has no desire to initiate it. She couldn't care if she ever has sex, but once they do, she enjoys it. She denies any obvious cause. No stressors.  Financially, health wise, and relationship wise they are doing well. He helps around the house. She loves and appreciates him. He is her best friend. They have fun together on dates. They go do things such as mini golf, other date nights, and have fun, laugh together. Her  has commented to her that she doesn't smile as much as she used to, but she states this is because she is usually doing things around the house, not just sitting around smiling. She is busy, but enjoys life. She has a healthy son, and carried twins as a surrogate.  She attempted a 3rd surrogacy but it failed. She is ok with this, has accepted it as \"meant to be\".  She isn't happy with her body, has gained some weight, but her low libido has been a problem long before her weight gain. She is not currently on any hormone treatment, her last hormone treatment for surrogacy was in June. She states the low libido also was a problem long before the hormone treatments.  Her periods are still fairly regular.  Normally cycle length is 31 days average, but from March to June she varied between 29 and 32 days.  Periods last 8 days on average, which is shorter than in the past.  1-2 days spotting, then stops, then another 5-6 days of heavy bleeding. Today is day 28.      She has tried watching movies, reading \"dirty\" " "books, etc, but nothing \"turns her on\".  She appreciates a good looking man but it doesn't create sex drive. She feels ok about her body, her  tells her he likes her body, so she doesn't feel that her body image is an issue. She denies excessive fatigue, depression.      Patient Active Problem List   Diagnosis     CARDIOVASCULAR SCREENING; LDL GOAL LESS THAN 160     HSIL (high grade squamous intraepithelial lesion) on Pap smear     Other disorder of menstruation and other abnormal bleeding from female genital tract     Past Surgical History:   Procedure Laterality Date     CONIZATION LEEP  05/12/08    LEEP     DILATION AND CURETTAGE, HYSTEROSCOPY DIAGNOSTIC, COMBINED N/A 3/25/2019    Procedure: Hysteroscopy, Dilatation and Curettage;  Surgeon: Eamon Rodriges MD;  Location: [facility] OR     HC EXCIS PRIMARY GANGLION WRIST  7/28/10     HC EXCISION TENDON SHEATH LESION, HAND/FINGR  09/15/10    left hand     HYSTEROSCOPY  02/01/10     and dilation and curettage and removal of uterine polyp     SURGICAL HISTORY OF -   age 3    ?eye surgery       Social History     Tobacco Use     Smoking status: Former Smoker     Packs/day: 0.50     Years: 3.00     Pack years: 1.50     Last attempt to quit: 5/1/2004     Years since quitting: 15.3     Smokeless tobacco: Never Used     Tobacco comment:  smokes - discussed secondhand smoke   Substance Use Topics     Alcohol use: Yes     Comment: infrequently     Family History   Problem Relation Age of Onset     Neurologic Disorder Mother         brain, fibromyalgia     Cancer Maternal Grandfather         skin     Heart Disease Maternal Grandfather      Breast Cancer Maternal Grandmother      Heart Disease Paternal Grandfather      Diabetes Paternal Grandfather      Cerebrovascular Disease Paternal Grandfather      Breast Cancer Maternal Aunt              Reviewed and updated as needed this visit by Provider  Tobacco  Allergies  Meds  Problems  Med Hx  Surg Hx  Fam " Hx         Review of Systems   10 pt ROS is otherwise negative except as noted in HPI.         Objective    /82   Pulse 129   Temp 97.6  F (36.4  C) (Temporal)   Resp 16   Wt 77.1 kg (170 lb)   SpO2 99%   BMI 31.35 kg/m    Body mass index is 31.35 kg/m .  Physical Exam   Vitals noted.  Patient alert, oriented, and in no acute distress.   Neck:  Supple without lymphadenopathy, JVD or masses.   CV:  RRR without murmur.   Respiratory:  Lungs clear to auscultation bilaterally.   She has no virilizing features.      Orders Placed This Encounter   Procedures     TSH with free T4 reflex     Progesterone     Estradiol     Ferritin     Vitamin D Deficiency     **Testosterone Free and Total FUTURE anytime            A:      ICD-10-CM    1. Decreased libido R68.82 TSH with free T4 reflex     Progesterone     Estradiol     Ferritin     **Testosterone Free and Total FUTURE anytime   2. Vitamin D deficiency E55.9 Vitamin D Deficiency       P:  We talked about the low likelihood of finding anything hormonally wrong with her but I agreed to check some basic labs. She had some labs done in June but she was going through fertility treatments at that time. I suspect that if this were a hormonal deficiency, she would likely have noticed some changes over time and through either her pregnancies or her fertility treatments.    She has no emotional or mood related issues, I don't suspect depression as a cause. We talked about ways to compromise on sex to prevent marital issues.  We discussed continuing date night, doing romantic things to continue to build their relationship and to allow him to continue to initiate sex since she does eventually enjoy it. I recommend open communication with her  about this, again to reduce marital stress as a result.     Will notify her with test results.     Glendy Kendrick MD

## 2019-08-21 LAB — DEPRECATED CALCIDIOL+CALCIFEROL SERPL-MC: 34 UG/L (ref 20–75)

## 2019-08-22 LAB
SHBG SERPL-SCNC: 60 NMOL/L (ref 30–135)
TESTOST FREE SERPL-MCNC: 0.25 NG/DL (ref 0.13–0.92)
TESTOST SERPL-MCNC: 22 NG/DL (ref 8–60)

## 2019-08-23 NOTE — PATIENT INSTRUCTIONS
As we discussed, your iron level is low, so I recommend you take an iron supplement such as ferrous sulfate or ferrous gluconate, one pill (324 mg) every OTHER day. This has been shown to give you just as good of absorption levels with fewer side effects.    Also, as we discussed, all of your hormone levels are normal.  The one medication you could consider for increasing her sex drive is called Estratest.  It does have some side effects related to the testosterone. Below is some information on this medication.     Overall, my recommendation is that you have some good discussions with your  about how to compromise on your sex drive.  Decreased sex drive for women is not abnormal but can cause relationship problems if not handled carefully.  Open communication plays a big role in that.  Please let me know if you have further questions or concerns.    Patient Education     Esterified Estrogens; Methyltestosterone tablets  Brand Names: Covaryx, Covaryx H.S., EEMT, EEMT HS, Essian, Essian HS, Estratest, Estratest HS, Syntest DS, Syntest HS  What is this medicine?  ESTERIFIED ESTROGENS; METHYLTESTOSTERONE (es TAIR i fyed ES troe jenz; meth il elke TOS te alfonso) is a combination of hormones. This medicine is used to treat some of the symptoms of menopause like hot flashes and vaginal dryness.  How should I use this medicine?  Take this medicine by mouth with a glass of water. To reduce nausea, this medicine may be taken with food. Follow the directions on the prescription label. Take this medicine at the same time each day. Do not take your medicine more often than directed.  Talk to your pediatrician regarding the use of this medicine in children. Special care may be needed.  A patient package insert for the product will be given with each prescription and refill. Read this sheet carefully each time. The sheet may change frequently.  What side effects may I notice from receiving this medicine?  Side effects that you  should report to your doctor or health care professional as soon as possible:    allergic reactions like skin rash, itching or hives, swelling of the face, lips, or tongue    breast tissue changes or discharge    changes in vision    chest pain    confusion, trouble speaking or understanding    dark urine    general ill feeling or flu-like symptoms    light-colored stools    nausea, vomiting    pain, swelling, warmth in the leg    right upper belly pain    severe headaches    shortness of breath    sudden numbness or weakness of the face, arm or leg    trouble walking, dizziness, loss of balance or coordination    unusual vaginal bleeding    yellowing of the eyes or skin  Side effects that usually do not require medical attention (report to your doctor or health care professional if they continue or are bothersome):    hair loss    increased hunger or thirst    increased urination    symptoms of vaginal infection like itching, irritation or unusual discharge    unusually weak or tired  What may interact with this medicine?  Do not take this medicine with any of the following medications:    medicines for cancer like aminoglutethimide, anastrozole, exemestane, letrozole, testolactone, vorozole  This medicine may also interact with the following medications:    antibiotics like erythromycin, clarithromycin    carbamazepine    female hormones, like estrogens or progestins and birth control pills    grapefruit juice    herbal remedies for menopause or female problems    insulin    itraconazole    ketoconazole    medicines that treat or prevent blood clots like warfarin    oxyphenbutazone    phenobarbital    rifampin    ritonavir    Leonid's Wort  What if I miss a dose?  If you miss a dose, take it as soon as you can. If it is almost time for your next dose, take only that dose. Do not take double or extra doses.  Where should I keep my medicine?  Keep out of the reach of children.  Store at room temperature between 15  and 30 degrees C (59 and 86 degrees F). Throw away any unused medicine after the expiration date.  What should I tell my health care provider before I take this medicine?  They need to know if you have any of these conditions:    abnormal vaginal bleeding    blood vessel disease or blood clots    breast, cervical, endometrial, ovarian, liver, or uterine cancer    dementia    diabetes    gallbladder disease    heart disease or recent heart attack    high blood pressure    high cholesterol    high level of calcium in the blood    hysterectomy    kidney disease    liver disease    migraine headaches    stroke    systemic lupus erythematosus (SLE)    tobacco smoker    vaginal bleeding    an unusual or allergic reaction to estrogens, other hormones, medicines, foods, dyes, or preservatives    pregnant or trying to get pregnant    breast-feeding  What should I watch for while using this medicine?  Visit your doctor or health care professional for regular checks on your progress. You will need a regular breast and pelvic exam and Pap smear while on this medicine. You should also discuss the need for regular mammograms with your health care professional, and follow his or her guidelines for these tests.  This medicine can make your body retain fluid, making your fingers, hands, or ankles swell. Your blood pressure can go up. Contact your doctor or health care professional if you feel you are retaining fluid.  If you have any reason to think you are pregnant, stop taking this medicine right away and contact your doctor or health care professional.  Smoking increases the risk of getting a blood clot or having a stroke while you are taking this medicine, especially if you are more than 35 years old. You are strongly advised not to smoke.  If you wear contact lenses and notice visual changes, or if the lenses begin to feel uncomfortable, consult your eye doctor or health care professional.  This medicine can increase the risk  of developing a condition (endometrial hyperplasia) that may lead to cancer of the lining of the uterus. Taking progestins, another hormone drug, with this medicine lowers the risk of developing this condition. Therefore, if your uterus has not been removed (by a hysterectomy), your doctor may prescribe a progestin for you to take together with your estrogen. You should know, however, that taking estrogens with progestins may have additional health risks. You should discuss the use of estrogens and progestins with your health care professional to determine the benefits and risks for you.  If you are going to have surgery, you may need to stop taking this medicine. Consult your health care professional for advice before you schedule the surgery.  NOTE:This sheet is a summary. It may not cover all possible information. If you have questions about this medicine, talk to your doctor, pharmacist, or health care provider. Copyright  2018 [employer]

## 2019-09-04 ENCOUNTER — E-VISIT (OUTPATIENT)
Dept: FAMILY MEDICINE | Facility: CLINIC | Age: 34
End: 2019-09-04
Payer: COMMERCIAL

## 2019-09-04 DIAGNOSIS — J20.9 ACUTE BRONCHITIS WITH SYMPTOMS > 10 DAYS: Primary | ICD-10-CM

## 2019-09-04 PROCEDURE — 99444 ZZC PHYSICIAN ONLINE EVALUATION & MANAGEMENT SERVICE: CPT | Performed by: FAMILY MEDICINE

## 2019-09-05 RX ORDER — AZITHROMYCIN 250 MG/1
TABLET, FILM COATED ORAL
Qty: 6 TABLET | Refills: 0 | Status: SHIPPED | OUTPATIENT
Start: 2019-09-05 | End: 2019-09-10

## 2019-09-06 NOTE — RESULT ENCOUNTER NOTE
Gretchen, all your labs are very normal.   I don't think any of this is hormone imbalance. I think this is just being a normal female. I don't recommend a testosterone supplement due to the side effects.   If you want to discuss this further or have me discuss with Jerry, I'd be happy to. Let me know.   Glendy Kendrick MD

## 2019-11-06 ENCOUNTER — HEALTH MAINTENANCE LETTER (OUTPATIENT)
Age: 34
End: 2019-11-06

## 2020-02-02 ENCOUNTER — MYC MEDICAL ADVICE (OUTPATIENT)
Dept: FAMILY MEDICINE | Facility: CLINIC | Age: 35
End: 2020-02-02

## 2020-02-02 DIAGNOSIS — J11.1 INFLUENZA WITH RESPIRATORY MANIFESTATION OTHER THAN PNEUMONIA: Primary | ICD-10-CM

## 2020-02-03 RX ORDER — OSELTAMIVIR PHOSPHATE 75 MG/1
75 CAPSULE ORAL 2 TIMES DAILY
Qty: 10 CAPSULE | Refills: 0 | Status: SHIPPED | OUTPATIENT
Start: 2020-02-03 | End: 2020-02-08

## 2020-02-03 NOTE — TELEPHONE ENCOUNTER
Influenza-Like Illness (ANUSHA) Protocol    Gretchen De Jesus      Age: 35 year old     YOB: 1985    Please select the type of triage protocol you used for this patient? Amor Arora and Emeka or another form of electronic triage protocol was used.     Influenza-Like Illness (ANUSHA) Standing Order    Has the patient been seen by a primary care provider at an [facility] within the past two years? Yes     Do any of the following exclusions apply to the patient?  Does the patient have a history of CrCl less than or equal to 60 ml/min No   Is the patient taking Probenecid No   Was an Intranasal live attenuated influenza vaccine (LAIV) received by the patient 2 weeks before antiviral medication No   Was an LAIV received 48 hours after administration of influenza antiviral drugs, if planning on obtaining? No     Does this patient have ANY of the above exclusions answered Yes?  No.      Is this patient currently showing symptoms of Influenza like Illness (ANUSHA) after close proximity to someone with a verified diagnosis of Influenza, or is this patient not sick but has had known exposure within less than or equal to 48 hours through close proximity with someone that has a verified diagnosis of Influenza?  This patient is currently sick with ANUSHA type symptoms     Does this patient have ANY of the following specialty conditions?  Chemotherapy or radiation within the last 3 months No   Organ or bone marrow transplant No   Metabolic disorder No   HIV patient with CD4 count <200 No     Does this patient have ANY of the above specialty conditions? No     Have the symptoms of ANUSHA been present for less than or equal to 48 hours? Yes     Adult Evaluation for Possible Influenza Treatment due to ANUSHA Symptoms      Below are conditions which place adults at increased risk for the more severe complications of influenza.    Does this patient have ANY of the  following conditions?  Is 65 years of age or older No   Chronic pulmonary disease such as asthma or COPD No   Heart disease (CHF, CAD, anticoagulation d/t arrhytmia, congenital heart anomaly) *HTN alone is excluded No   Kidney disease (renal failure, insufficiency or dialysis) No   Hepatic or Hematologic disorder (e.g. chronic liver disease patient, sickle cell disease) No   Diabetes (Type 1 or Type 2) No   Neurologic and Neurodevelopment Conditions (including disorders of the brain, spinal cord, peripheral nerve, and muscle, such as cerebral palsy, epilepsy (seizure disorders), stroke, intellectual disability, moderate to severe developmental delay, muscular dystrophy, or spinal cord injury) No   Obese with BMI >40 No   Immunosuppression caused by medications such as those taking prednisone in excess of 20mg daily, or caused by HIV/AIDS with CD4 count more than 200 No   Is pregnant, may be pregnant, or is within two weeks after delivery No   Is a resident of a [facility] care facility No   Is patient  or [place] native No   Is <19 years old and is receiving long term aspirin- or salicylate-containing medications No     Does this patient have ANY of the above conditions?  NO        Current parent reported weight:    Wt Readings from Last 1 Encounters:   08/20/19 77.1 kg (170 lb)       Does the patient require a re-weigh?No, the patient is not pediatric and doesn't require parent weight.     The patient qualifies as a patient that may benefit from an order of Tamiflu for treatment of ANUSHA per the standing order.    Use SmartSet Standing Order for Influenza Symptom Treatment BID to find suggested Tamiflu order.    Note: if the patient is taking Warfarin (Coumadin), it is okay to order Tamifu if patient has a follow up with INR nurse within 3-5 days of ordering Tamiflu. Assist with scheduling to secure appointment whenever possible.    Have the patient notify their provider of:  - Worsening Symptoms  -  Transient Neuropsychiatric events (self-injury and/or delirium)  - Skin Reactions    Additional educational resources include:    [email or web address] [email or web address]    http://www.cdc.gov/flu/  Rekha Tristan RN

## 2020-02-04 ENCOUNTER — OFFICE VISIT (OUTPATIENT)
Dept: FAMILY MEDICINE | Facility: CLINIC | Age: 35
End: 2020-02-04
Payer: COMMERCIAL

## 2020-02-04 VITALS
HEIGHT: 62 IN | HEART RATE: 121 BPM | WEIGHT: 163.13 LBS | TEMPERATURE: 100.4 F | DIASTOLIC BLOOD PRESSURE: 64 MMHG | SYSTOLIC BLOOD PRESSURE: 112 MMHG | OXYGEN SATURATION: 98 % | RESPIRATION RATE: 13 BRPM | BODY MASS INDEX: 30.02 KG/M2

## 2020-02-04 DIAGNOSIS — E61.1 IRON DEFICIENCY: ICD-10-CM

## 2020-02-04 DIAGNOSIS — L65.9 LOSS OF HAIR: ICD-10-CM

## 2020-02-04 DIAGNOSIS — Z00.00 ROUTINE GENERAL MEDICAL EXAMINATION AT A HEALTH CARE FACILITY: Primary | ICD-10-CM

## 2020-02-04 PROCEDURE — 99395 PREV VISIT EST AGE 18-39: CPT | Performed by: FAMILY MEDICINE

## 2020-02-04 RX ORDER — CHOLECALCIFEROL (VITAMIN D3) 50 MCG
1 TABLET ORAL DAILY
Qty: 90 TABLET | Refills: 1 | Status: SHIPPED | OUTPATIENT
Start: 2020-02-04 | End: 2023-08-08

## 2020-02-04 RX ORDER — FERROUS GLUCONATE 324(38)MG
324 TABLET ORAL
Qty: 90 TABLET | Refills: 1 | Status: SHIPPED | OUTPATIENT
Start: 2020-02-04 | End: 2023-08-08

## 2020-02-04 SDOH — HEALTH STABILITY: MENTAL HEALTH: HOW MANY STANDARD DRINKS CONTAINING ALCOHOL DO YOU HAVE ON A TYPICAL DAY?: 1 OR 2

## 2020-02-04 SDOH — HEALTH STABILITY: MENTAL HEALTH: HOW OFTEN DO YOU HAVE A DRINK CONTAINING ALCOHOL?: MONTHLY OR LESS

## 2020-02-04 SDOH — HEALTH STABILITY: MENTAL HEALTH: HOW OFTEN DO YOU HAVE 6 OR MORE DRINKS ON ONE OCCASION?: NEVER

## 2020-02-04 ASSESSMENT — ENCOUNTER SYMPTOMS
BREAST MASS: 0
DIARRHEA: 1
PALPITATIONS: 0
CONSTIPATION: 0
MYALGIAS: 1
NERVOUS/ANXIOUS: 0
HEADACHES: 1
SHORTNESS OF BREATH: 0
ARTHRALGIAS: 1
JOINT SWELLING: 0
DYSURIA: 0
COUGH: 0
DIZZINESS: 1
HEMATOCHEZIA: 0
NAUSEA: 1
HEARTBURN: 0
ABDOMINAL PAIN: 0
EYE PAIN: 0
SORE THROAT: 0
FEVER: 0
FREQUENCY: 0
WEAKNESS: 1
HEMATURIA: 0
PARESTHESIAS: 1
CHILLS: 1

## 2020-02-04 ASSESSMENT — MIFFLIN-ST. JEOR: SCORE: 1380.24

## 2020-02-04 ASSESSMENT — PAIN SCALES - GENERAL: PAINLEVEL: MODERATE PAIN (5)

## 2020-02-04 NOTE — PATIENT INSTRUCTIONS
You had a low iron level when we checked in August 2019. Please start on the iron supplement I ordered for you. This may help with your hair loss as well. You can try taking a Vitamin D supplement too. I also placed a referral to a dermatologist. Plan to see a dermatologist in 2 months after taking both supplements. If your hair loss improves before then, you can cancel your dermatology appointment.     Consider getting an Influenza Vaccine when you are feeling better.      You'll be due for another PAP in late 2021.       Preventive Health Recommendations  Female Ages 26 - 39  Yearly exam:   See your health care provider every year in order to    Review health changes.     Discuss preventive care.      Review your medicines if you your doctor has prescribed any.    Until age 30: Get a Pap test every three years (more often if you have had an abnormal result).    After age 30: Talk to your doctor about whether you should have a Pap test every 3 years or have a Pap test with HPV screening every 5 years.   You do not need a Pap test if your uterus was removed (hysterectomy) and you have not had cancer.  You should be tested each year for STDs (sexually transmitted diseases), if you're at risk.   Talk to your provider about how often to have your cholesterol checked.  If you are at risk for diabetes, you should have a diabetes test (fasting glucose).  Shots: Get a flu shot each year. Get a tetanus shot every 10 years.   Nutrition:     Eat at least 5 servings of fruits and vegetables each day.    Eat whole-grain bread, whole-wheat pasta and brown rice instead of white grains and rice.    Get adequate Calcium and Vitamin D.     Lifestyle    Exercise at least 150 minutes a week (30 minutes a day, 5 days of the week). This will help you control your weight and prevent disease.    Limit alcohol to one drink per day.    No smoking.     Wear sunscreen to prevent skin cancer.    See your dentist every six months for an exam  and cleaning.

## 2020-02-04 NOTE — PROGRESS NOTES
SUBJECTIVE:   CC: Gretchen De Jesus is an 35 year old woman who presents for preventive health visit.     Healthy Habits:     Getting at least 3 servings of Calcium per day:  Yes    Bi-annual eye exam:  NO    Dental care twice a year:  NO    Sleep apnea or symptoms of sleep apnea:  None    Diet:  Other    Frequency of exercise:  1 day/week    Duration of exercise:  Less than 15 minutes    Taking medications regularly:  Yes    Medication side effects:  Not applicable    PHQ-2 Total Score: 0    Additional concerns today:  Yes      CONCERNS: See ROS.        Today's PHQ-2 Score:   PHQ-2 ( 1999 Pfizer) 2/4/2020   Q1: Little interest or pleasure in doing things 0   Q2: Feeling down, depressed or hopeless 0   PHQ-2 Score 0   Q1: Little interest or pleasure in doing things Not at all   Q2: Feeling down, depressed or hopeless Not at all   PHQ-2 Score 0       Abuse: Current or Past(Physical, Sexual or Emotional)- No  Do you feel safe in your environment? Yes        Social History     Tobacco Use     Smoking status: Former Smoker     Packs/day: 0.50     Years: 3.00     Pack years: 1.50     Last attempt to quit: 5/1/2004     Years since quitting: 15.7     Smokeless tobacco: Never Used     Tobacco comment:  smokes - discussed secondhand smoke   Substance Use Topics     Alcohol use: Yes     Frequency: Monthly or less     Drinks per session: 1 or 2     Binge frequency: Never     Comment: infrequently       Alcohol Use 2/4/2020   Prescreen: >3 drinks/day or >7 drinks/week? No   Prescreen: >3 drinks/day or >7 drinks/week? -   No flowsheet data found.    Reviewed orders with patient.  Reviewed health maintenance and updated orders accordingly - Yes  Patient Active Problem List   Diagnosis     CARDIOVASCULAR SCREENING; LDL GOAL LESS THAN 160     HSIL (high grade squamous intraepithelial lesion) on Pap smear     Other disorder of menstruation and other abnormal bleeding from female genital tract     Past Surgical History:    Procedure Laterality Date     CONIZATION LEEP  05/12/08    LEEP     DILATION AND CURETTAGE, HYSTEROSCOPY DIAGNOSTIC, COMBINED N/A 3/25/2019    Procedure: Hysteroscopy, Dilatation and Curettage;  Surgeon: Eamon Rodriges MD;  Location: [facility]     HC EXCIS PRIMARY GANGLION WRIST  7/28/10     HC EXCISION TENDON SHEATH LESION, HAND/FINGR  09/15/10    left hand     HYSTEROSCOPY  02/01/10     and dilation and curettage and removal of uterine polyp     SURGICAL HISTORY OF -   age 3    ?eye surgery       Social History     Tobacco Use     Smoking status: Former Smoker     Packs/day: 0.50     Years: 3.00     Pack years: 1.50     Last attempt to quit: 5/1/2004     Years since quitting: 15.7     Smokeless tobacco: Never Used     Tobacco comment:  smokes - discussed secondhand smoke   Substance Use Topics     Alcohol use: Yes     Frequency: Monthly or less     Drinks per session: 1 or 2     Binge frequency: Never     Comment: infrequently     Family History   Problem Relation Age of Onset     Neurologic Disorder Mother         brain, fibromyalgia     Cancer Maternal Grandfather         skin     Heart Disease Maternal Grandfather      Breast Cancer Maternal Grandmother      Heart Disease Paternal Grandfather      Diabetes Paternal Grandfather      Cerebrovascular Disease Paternal Grandfather      Breast Cancer Maternal Aunt          Current Outpatient Medications   Medication Sig Dispense Refill     ferrous gluconate (FERGON) 324 (38 Fe) MG tablet Take 1 tablet (324 mg) by mouth daily (with breakfast) 90 tablet 1     oseltamivir (TAMIFLU) 75 MG capsule Take 1 capsule (75 mg) by mouth 2 times daily for 5 days 10 capsule 0     UNABLE TO FIND MEDICATION NAME: healthy hair and nails       Vitamin D, Cholecalciferol, 1000 units CAPS Take 1,000 Int'l Units by mouth 2 times daily       vitamin D3 (CHOLECALCIFEROL) 2000 units (50 mcg) tablet Take 1 tablet (2,000 Units) by mouth daily 90 tablet 1     No Known  Allergies    Mammogram not appropriate for this patient based on age.    Pertinent mammograms are reviewed under the imaging tab.  History of abnormal Pap smear: NO - age 30- 65 PAP every 3 years recommended  PAP / HPV Latest Ref Rng & Units 10/31/2018 12/27/2016 7/20/2015   PAP - NIL NIL NIL   HPV 16 DNA NEG:Negative Negative Negative Negative   HPV 18 DNA NEG:Negative Negative Negative Negative   OTHER HR HPV NEG:Negative Negative Negative Negative     Reviewed and updated as needed this visit by clinical staff  Tobacco  Allergies  Meds  Fam Hx  Soc Hx        Reviewed and updated as needed this visit by Provider  Tobacco  Allergies  Meds  Problems  Med Hx  Surg Hx  Fam Hx        Past Medical History:   Diagnosis Date     Abnormal glandular Papanicolaou smear of cervix     Abn. Pap smear (cervix)     Allergic rhinitis, cause unspecified     Allergic rhinitis     Cervical dysplasia      Hypoglycemia      Migraine      Other spontaneous pneumothorax 09/14/06    D/C 09/17/06-left spontaneous pneumothorax     PCOS (polycystic ovarian syndrome)       Past Surgical History:   Procedure Laterality Date     CONIZATION LEEP  05/12/08    LEEP     DILATION AND CURETTAGE, HYSTEROSCOPY DIAGNOSTIC, COMBINED N/A 3/25/2019    Procedure: Hysteroscopy, Dilatation and Curettage;  Surgeon: Eamon Rodriges MD;  Location: [facility]     HC EXCIS PRIMARY GANGLION WRIST  7/28/10     HC EXCISION TENDON SHEATH LESION, HAND/FINGR  09/15/10    left hand     HYSTEROSCOPY  02/01/10     and dilation and curettage and removal of uterine polyp     SURGICAL HISTORY OF -   age 3    ?eye surgery       Review of Systems   Constitutional: Positive for chills. Negative for fever.   HENT: Negative for congestion, ear pain, hearing loss and sore throat.    Eyes: Negative for pain and visual disturbance.   Respiratory: Negative for cough and shortness of breath.    Cardiovascular: Negative for chest pain, palpitations and peripheral edema.  "  Gastrointestinal: Positive for diarrhea and nausea. Negative for abdominal pain, constipation, heartburn and hematochezia.   Breasts:  Negative for tenderness, breast mass and discharge.   Genitourinary: Negative for dysuria, frequency, genital sores, hematuria, pelvic pain, urgency, vaginal bleeding and vaginal discharge.   Musculoskeletal: Positive for arthralgias and myalgias. Negative for joint swelling.   Skin: Negative for rash.   Neurological: Positive for dizziness, weakness, headaches and paresthesias.   Psychiatric/Behavioral: Negative for mood changes. The patient is not nervous/anxious.      She mentions that she has been experiencing hair loss and breakage for the last 6-9 months. She has noticed large chunks of hair falling out and breaking. The hair on the top of her head is as short at 2 inches. She cannot put it up in a ponytail because of the breakage. Her scalp has also been increasingly itchy lately. She says that she feels very embarrassed about her hair loss. She switched to a different shampoo and conditioner 3 months ago but hasn't noticed any change. She has also started taking a Hair and Nail supplement. She had a TSH collected in 8/2019 which was normal.     She contacted the clinic on 2/2/2020 complaining of myalgias, chills, fatigue, hot flashes, fever, and nausea. She was started on Tamiflu at that time. She says that she is feeling much better since starting Tamiflu. She mentions that she has experienced some diarrhea today as well.     Her  has a vasectomy for contraception.     Her ferritin level was borderline low in 8/2019.        OBJECTIVE:   /64   Pulse 121   Temp 100.4  F (38  C) (Tympanic)   Resp 13   Ht 1.562 m (5' 1.5\")   Wt 74 kg (163 lb 2 oz)   LMP 01/16/2020 (Approximate)   SpO2 98%   BMI 30.32 kg/m    Physical Exam  GENERAL: healthy, alert and no distress  EYES: Eyes grossly normal to inspection, PERRL and conjunctivae and sclerae normal  HENT: " There is a lot of hair around the crown about 4 inches in length, with some wisps that are 8-9 inches in length with obvious breakage. The hair itself feels soft and doesn't feel brittle. The scalp is normal. No patchy areas of loss. ear canals and TM's normal, nose normal. Mild erythema of tonsillar pillars. Mouth is without ulcers or lesions.   NECK: no adenopathy, no asymmetry, masses, or scars and thyroid normal to palpation  RESP: lungs clear to auscultation - no rales, rhonchi or wheezes  BREAST: normal without masses, tenderness or nipple discharge and no palpable axillary masses or adenopathy  CV: regular rate and tachycardic, normal S1 S2, no S3 or S4, no murmur, click or rub, no peripheral edema and peripheral pulses strong  ABDOMEN: soft, nontender, no hepatosplenomegaly, no masses and bowel sounds normal  MS: no gross musculoskeletal defects noted, no edema  SKIN: no suspicious lesions or rashes  NEURO: Normal strength and tone, mentation intact and speech normal  PSYCH: mentation appears normal, affect normal/bright    Diagnostic Test Results:  No orders of the defined types were placed in this encounter.      ASSESSMENT/PLAN:       ICD-10-CM    1. Routine general medical examination at a health care facility  Z00.00 UNABLE TO FIND   2. Loss of hair  L65.9 vitamin D3 (CHOLECALCIFEROL) 2000 units (50 mcg) tablet     DERMATOLOGY REFERRAL   3. Iron deficiency  E61.1 ferrous gluconate (FERGON) 324 (38 Fe) MG tablet     - PAP was NIL in 10/2018. Will repeat in 2021.   - No labs indicated today.   - No refills needed at this visit.   - Mammogram not indicated due to age.   - Encouraged her to start on an iron supplement due to her low Ferritin. Prescribed Iron and Vitamin D, see orders. This may also help with her hair loss. Encouraged her to work on taking these supplements daily for 2 months. I placed a Dermatology referral, see orders. If her hair loss does not improve after 2 months, she should have this  "evaluated by the dermatologist.   - Encouraged her to finish her Tamiflu course. Discussed supportive cares at length, including use of humidifier, rest, warm showers, pushing fluids, tylenol/ibuprofen prn pain or fever, and OTC meds.  Will f/u if not improving or if showing any signs of worsening illness.   - She will consider getting an Influenza Vaccination after she is off Tamiflu.   - Follow up for routine physical in 1 year, or sooner if needed.     COUNSELING:  Reviewed preventive health counseling, as reflected in patient instructions       Regular exercise       Healthy diet/nutrition       Vision screening       Hearing screening       Contraception    Estimated body mass index is 30.32 kg/m  as calculated from the following:    Height as of this encounter: 1.562 m (5' 1.5\").    Weight as of this encounter: 74 kg (163 lb 2 oz).    Weight management plan: Discussed healthy diet and exercise guidelines     reports that she quit smoking about 15 years ago. She has a 1.50 pack-year smoking history. She has never used smokeless tobacco.      Counseling Resources:  ATP IV Guidelines  Pooled Cohorts Equation Calculator  Breast Cancer Risk Calculator  FRAX Risk Assessment  ICSI Preventive Guidelines  Dietary Guidelines for Americans, 2010  USDA's MyPlate  ASA Prophylaxis  Lung CA Screening    This document serves as a record of services personally performed by Glendy Kendrick MD. It was created on their behalf by Josie Doran, a trained medical scribe. The creation of this record is based on the provider's personal observations and the statements of the patient. This document has been checked and approved by the attending provider.    Glendy Kendrick MD  [facility]  "

## 2020-02-06 NOTE — NURSING NOTE
[employer] message sent to patient with locations available for Dermatology and to message with location that will work best for her. Kaye Hoyt LPN

## 2020-05-12 ENCOUNTER — MYC MEDICAL ADVICE (OUTPATIENT)
Dept: DERMATOLOGY | Facility: CLINIC | Age: 35
End: 2020-05-12

## 2020-05-26 ENCOUNTER — VIRTUAL VISIT (OUTPATIENT)
Dept: DERMATOLOGY | Facility: CLINIC | Age: 35
End: 2020-05-26
Attending: FAMILY MEDICINE
Payer: COMMERCIAL

## 2020-05-26 ENCOUNTER — TELEPHONE (OUTPATIENT)
Dept: DERMATOLOGY | Facility: CLINIC | Age: 35
End: 2020-05-26

## 2020-05-26 DIAGNOSIS — L65.9 ALOPECIA: Primary | ICD-10-CM

## 2020-05-26 PROCEDURE — 99202 OFFICE O/P NEW SF 15 MIN: CPT | Mod: 95 | Performed by: DERMATOLOGY

## 2020-05-26 ASSESSMENT — PAIN SCALES - GENERAL: PAINLEVEL: NO PAIN (0)

## 2020-05-26 NOTE — LETTER
5/26/2020         RE: Gretchen De Jesus  [address] [place] [address]        Dear Colleague,    Thank you for referring your patient, Gretchen De Jesus, to the [facility]. Please see a copy of my visit note below.    [facility]  Video: ( Invitation sent by:  [employer] and text to cell phone: [phone] )      Impression and Recommendations (Patient Counseled on the Following):  1. 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 Ferritin 12 8/2019 and unable to tolerate supplement after 1 month due to GI upset, Taking vitamin D. Last TSH 8/2019 was normal. Taking hair skin and nails product OTC.   -hold biotin, reviewed the FDA warning.  -offered Rogaine 5% once daily for mild frontotemporal thinning  -consider hair mounting may be consider if not improving  -recommend silk pillow for hair, avoid heat styling, avoid any hair styling procedures such as dyes, perms bleachs  -For alopecia, will obtain TSH with reflex T4, CBC with Diff, ferritin, and Vitamin D.   -head and shoulders shampoo twice weekly  -may use conditioner on tips and rinse once additional day  -use wide toothed comb  -increase iron containing foods  -loose scarf, loose hat, wig okay if no tension or pulling  -when able to schedule hair cut.          Follow-up:   Follow-up with dermatology in approximately 6 weeks. Earlier for new or changing lesions or rash.      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 Gretchen De Jesus is a 35 year old female who presents via teledermatology for evaluation of hair loss and thinning. She has noticed some flaking. No dryness, itching, pain, or redness. Is missing chunks of hair. Uses head and shoulders twice weekly and washes twice weekly or gets dry and brittle. . Not heat styling frequently. Has used RedKen products. Overall, she feesl the hair loss started a little over a year ago. She noted chunks of very short hair. She was going through being a surrogate early 2019, stopped all these medications end of April 2019. HAir loss started in April 2019.  The transfer was a \"biochemical pregnancy,\" and miscarriage. Did not notice any shedding in 3-6 months. Denies any shedding. Has not dyed hair in a year and half.    She feels this is more breaking related and less    Prior to 6 months ago(before changing her hair regimen) she was still washing twice daily, she was heat styling more often. Has not bleached or highlighted hair since Nov 2018.     6 months ago was not swimming.  Has not had perms, other products, relaxers, hair blowouts. She conditions hair twice weekly. She uses a redken conditioning mask .    Has seen derm in the past.     She has noticed nails peeling and more flexible. The hemp oil is helped and this resolved.     Eyebrows and lashes overall thin and fine. SHe does not feels he is losing them. She is not losing hair anywhere else on her body but her legs have always not grown much hair.   ROS: Patient is generally feeling well today   Not pregnant    Physical Examination:  General: Well-appearing  appropriately-developed individual.  Skin: Focused examination within the " teledermatology photograph(s) including the following was performed.   -frontal hair line appears intact  -mild flaky scale noted  -photo with patient pulling hair up and shorter fibers, possibly 5cm in length being pulled up, possible regrowth  -video hair part approx 1.2    Labs:  Component      Latest Ref Rng & Units 6/14/2019   Cannabinoids (06-tdi-8-carboxy-9-THC)      NDET:Not Detected ng/mL Not Detected   Phencyclidine (Phencyclidine)      NDET:Not Detected ng/mL Not Detected   Cocaine (Benzoylecgonine)      NDET:Not Detected ng/mL Not Detected   Methamphetamine (d-Methamphetamine)      NDET:Not Detected ng/mL Not Detected   Opiates (Morphine)      NDET:Not Detected ng/mL Not Detected   Amphetamine (d-Amphetamine)      NDET:Not Detected ng/mL Not Detected   Benzodiazepines (Nordiazepam)      NDET:Not Detected ng/mL Detected, Abnormal Result (A)   Tricyclic Antidepressants (Desipramine)      NDET:Not Detected ng/mL Not Detected   Methadone (Methadone)      NDET:Not Detected ng/mL Not Detected   Barbiturates (Butalbital)      NDET:Not Detected ng/mL Not Detected   Oxycodone (Oxycodone)      NDET:Not Detected ng/mL Not Detected   Propoxyphene (Norpropoxyphene)      NDET:Not Detected ng/mL Not Detected   Buprenorphine (Buprenorphine)      NDET:Not Detected ng/mL Not Detected   Diazepam Confirm Urine      ng/ml Negative   Oxazepam Confirm Urine      ng/ml 110   Temazepam Confirm Urine      ng/ml 100   Alproazolam Confirm Urine      ng/ml Negative   A-OH-Alprazolam Confirm Urine      ng/ml Negative   Lorazepam Confirm Urine      ng/ml Negative   Testosterone Total      8 - 60 ng/dL    Sex Hormone Binding Globulin      30 - 135 nmol/L    Free Testosterone Calculated      0.13 - 0.92 ng/dL    HCG Quantitative Serum      0 - 5 IU/L 99 (H)   Progesterone      ng/mL    Estradiol      pg/mL    TSH      0.40 - 4.00 mU/L    Ferritin      12 - 150 ng/mL    Vitamin D Deficiency screening      20 - 75 ug/L        Past Medical  History:   Patient Active Problem List   Diagnosis     CARDIOVASCULAR SCREENING; LDL GOAL LESS THAN 160     HSIL (high grade squamous intraepithelial lesion) on Pap smear     Other disorder of menstruation and other abnormal bleeding from female genital tract     Past Medical History:   Diagnosis Date     Abnormal glandular Papanicolaou smear of cervix     Abn. Pap smear (cervix)     Allergic rhinitis, cause unspecified     Allergic rhinitis     Cervical dysplasia      Hypoglycemia      Migraine      Other spontaneous pneumothorax 09/14/06    D/C 09/17/06-left spontaneous pneumothorax     PCOS (polycystic ovarian syndrome)      Past Surgical History:   Procedure Laterality Date     CONIZATION LEEP  05/12/08    LEEP     DILATION AND CURETTAGE, HYSTEROSCOPY DIAGNOSTIC, COMBINED N/A 3/25/2019    Procedure: Hysteroscopy, Dilatation and Curettage;  Surgeon: Eamon Rodriges MD;  Location: [facility]     HC EXCIS PRIMARY GANGLION WRIST  7/28/10     HC EXCISION TENDON SHEATH LESION, HAND/FINGR  09/15/10    left hand     HYSTEROSCOPY  02/01/10     and dilation and curettage and removal of uterine polyp     SURGICAL HISTORY OF -   age 3    ?eye surgery       Social History:  Patient reports that she quit smoking about 16 years ago. She has a 1.50 pack-year smoking history. She has never used smokeless tobacco. She reports current alcohol use. She reports that she does not use drugs.  Own baby in 2014, gave birth to surrogate twins in 2016  Family History:  Family History   Problem Relation Age of Onset     Neurologic Disorder Mother         brain, fibromyalgia     Cancer Maternal Grandfather         skin     Heart Disease Maternal Grandfather      Breast Cancer Maternal Grandmother      Heart Disease Paternal Grandfather      Diabetes Paternal Grandfather      Cerebrovascular Disease Paternal Grandfather      Breast Cancer Maternal Aunt    Mom with hair loss issues, unclear diagnosis.  Dad has always been bald.      Medications:  Current Outpatient Medications   Medication     UNABLE TO FIND     vitamin D3 (CHOLECALCIFEROL) 2000 units (50 mcg) tablet     ferrous gluconate (FERGON) 324 (38 Fe) MG tablet     Vitamin D, Cholecalciferol, 1000 units CAPS     No current facility-administered medications for this visit.       Reviewed with MD. Not taking iron, is taking vitamin D.     No Known Allergies      _____________________________________________________________________________    Teledermatology information:  - Location of patient: Other work  - Patient presented as: provider referral  - Location of teledermatologist:  ([facility] )  - Reason teledermatology is appropriate:  of National Emergency Regarding Coronavirus disease (COVID 19) Outbreak  - Image quality and interpretability: acceptable  - Physician has received verbal consent for a Video/Photos Visit from the patient? Yes  - In-person dermatology visit recommendation: no  - Date of images: 5/25/2020 and 5/26/2020  - Service start time:3:53pm  - Service end time:4:43pm  - Date of report: 5/26/2020       Again, thank you for allowing me to participate in the care of your patient.        Sincerely,        Zuleyka Enamorado MD

## 2020-05-26 NOTE — PROGRESS NOTES
[facility] Record:  Video: ( Invitation sent by:  Calvin and text to cell phone: [phone] )      Impression and Recommendations (Patient Counseled on the Following):  1. 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 Ferritin 12 8/2019 and unable to tolerate supplement after 1 month due to GI upset, Taking vitamin D. Last TSH 8/2019 was normal. Taking hair skin and nails product OTC.   -hold biotin, reviewed the FDA warning.  -offered Rogaine 5% once daily for mild frontotemporal thinning  -consider hair mounting may be consider if not improving  -recommend silk pillow for hair, avoid heat styling, avoid any hair styling procedures such as dyes, perms bleachs  -For alopecia, will obtain TSH with reflex T4, CBC with Diff, ferritin, and Vitamin D.   -head and shoulders shampoo twice weekly  -may use conditioner on tips and rinse once additional day  -use wide toothed comb  -increase iron containing foods  -loose scarf, loose hat, wig okay if no tension or pulling  -when able to schedule hair cut.          Follow-up:   Follow-up with dermatology in approximately 6 weeks. Earlier for new or changing lesions or rash.      Staff only:    Zuleyka Enamorado MD    Department of Dermatology  [facility]: Phone: [phone], Fax:[phone]  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 Gretchen De Jesus is a 35 year old female who presents via teledermatology for evaluation of hair loss and thinning. She has noticed some flaking. No dryness, itching, pain, or redness. Is missing chunks of hair. Uses head and shoulders twice weekly and washes twice weekly or gets dry and brittle. . Not heat styling frequently. Has used RedKen products. Overall, she feesl the hair loss started a little over a year ago. She noted chunks of very short hair. She was going through being a surrogate early 2019, stopped all these medications end of April 2019. HAir loss started in April 2019.  The transfer was a \"biochemical pregnancy,\" and miscarriage. Did not notice any shedding in 3-6 months. Denies any shedding. Has not dyed hair in a year and half.    She feels this is more breaking related and less    Prior to 6 months ago(before changing her hair regimen) she was still washing twice daily, she was heat styling more often. Has not bleached or highlighted hair since Nov 2018.     6 months ago was not swimming.  Has not had perms, other products, relaxers, hair blowouts. She conditions hair twice weekly. She uses a redken conditioning mask .    Has seen derm in the past.     She has noticed nails peeling and more flexible. The hemp oil is helped and this resolved.     Eyebrows and lashes overall thin and fine. SHe does not feels he is losing them. She is not losing hair anywhere else on her body but her legs have always not grown much hair.   ROS: Patient is generally feeling well today   Not pregnant    Physical Examination:  General: Well-appearing  appropriately-developed individual.  Skin: Focused examination within the teledermatology photograph(s) including the following was performed.   -frontal hair line appears intact  -mild flaky scale noted  -photo with patient pulling hair up and shorter fibers, possibly 5cm in length being pulled up, possible regrowth  -video hair part " approx 1.2    Labs:  Component      Latest Ref Rng & Units 6/14/2019   Cannabinoids (90-ytw-4-carboxy-9-THC)      NDET:Not Detected ng/mL Not Detected   Phencyclidine (Phencyclidine)      NDET:Not Detected ng/mL Not Detected   Cocaine (Benzoylecgonine)      NDET:Not Detected ng/mL Not Detected   Methamphetamine (d-Methamphetamine)      NDET:Not Detected ng/mL Not Detected   Opiates (Morphine)      NDET:Not Detected ng/mL Not Detected   Amphetamine (d-Amphetamine)      NDET:Not Detected ng/mL Not Detected   Benzodiazepines (Nordiazepam)      NDET:Not Detected ng/mL Detected, Abnormal Result (A)   Tricyclic Antidepressants (Desipramine)      NDET:Not Detected ng/mL Not Detected   Methadone (Methadone)      NDET:Not Detected ng/mL Not Detected   Barbiturates (Butalbital)      NDET:Not Detected ng/mL Not Detected   Oxycodone (Oxycodone)      NDET:Not Detected ng/mL Not Detected   Propoxyphene (Norpropoxyphene)      NDET:Not Detected ng/mL Not Detected   Buprenorphine (Buprenorphine)      NDET:Not Detected ng/mL Not Detected   Diazepam Confirm Urine      ng/ml Negative   Oxazepam Confirm Urine      ng/ml 110   Temazepam Confirm Urine      ng/ml 100   Alproazolam Confirm Urine      ng/ml Negative   A-OH-Alprazolam Confirm Urine      ng/ml Negative   Lorazepam Confirm Urine      ng/ml Negative   Testosterone Total      8 - 60 ng/dL    Sex Hormone Binding Globulin      30 - 135 nmol/L    Free Testosterone Calculated      0.13 - 0.92 ng/dL    HCG Quantitative Serum      0 - 5 IU/L 99 (H)   Progesterone      ng/mL    Estradiol      pg/mL    TSH      0.40 - 4.00 mU/L    Ferritin      12 - 150 ng/mL    Vitamin D Deficiency screening      20 - 75 ug/L        Past Medical History:   Patient Active Problem List   Diagnosis     CARDIOVASCULAR SCREENING; LDL GOAL LESS THAN 160     HSIL (high grade squamous intraepithelial lesion) on Pap smear     Other disorder of menstruation and other abnormal bleeding from female genital tract      Past Medical History:   Diagnosis Date     Abnormal glandular Papanicolaou smear of cervix     Abn. Pap smear (cervix)     Allergic rhinitis, cause unspecified     Allergic rhinitis     Cervical dysplasia      Hypoglycemia      Migraine      Other spontaneous pneumothorax 09/14/06    D/C 09/17/06-left spontaneous pneumothorax     PCOS (polycystic ovarian syndrome)      Past Surgical History:   Procedure Laterality Date     CONIZATION LEEP  05/12/08    LEEP     DILATION AND CURETTAGE, HYSTEROSCOPY DIAGNOSTIC, COMBINED N/A 3/25/2019    Procedure: Hysteroscopy, Dilatation and Curettage;  Surgeon: Eamon Rodriges MD;  Location: [facility]     HC EXCIS PRIMARY GANGLION WRIST  7/28/10     HC EXCISION TENDON SHEATH LESION, HAND/FINGR  09/15/10    left hand     HYSTEROSCOPY  02/01/10     and dilation and curettage and removal of uterine polyp     SURGICAL HISTORY OF -   age 3    ?eye surgery       Social History:  Patient reports that she quit smoking about 16 years ago. She has a 1.50 pack-year smoking history. She has never used smokeless tobacco. She reports current alcohol use. She reports that she does not use drugs.  Own baby in 2014, gave birth to surrogate twins in 2016  Family History:  Family History   Problem Relation Age of Onset     Neurologic Disorder Mother         brain, fibromyalgia     Cancer Maternal Grandfather         skin     Heart Disease Maternal Grandfather      Breast Cancer Maternal Grandmother      Heart Disease Paternal Grandfather      Diabetes Paternal Grandfather      Cerebrovascular Disease Paternal Grandfather      Breast Cancer Maternal Aunt    Mom with hair loss issues, unclear diagnosis.  Dad has always been bald.     Medications:  Current Outpatient Medications   Medication     UNABLE TO FIND     vitamin D3 (CHOLECALCIFEROL) 2000 units (50 mcg) tablet     ferrous gluconate (FERGON) 324 (38 Fe) MG tablet     Vitamin D, Cholecalciferol, 1000 units CAPS     No current  facility-administered medications for this visit.       Reviewed with MD. Not taking iron, is taking vitamin D.     No Known Allergies      _____________________________________________________________________________    Teledermatology information:  - Location of patient: Other work  - Patient presented as: provider referral  - Location of teledermatologist:  ([facility] )  - Reason teledermatology is appropriate:  of National Emergency Regarding Coronavirus disease (COVID 19) Outbreak  - Image quality and interpretability: acceptable  - Physician has received verbal consent for a Video/Photos Visit from the patient? Yes  - In-person dermatology visit recommendation: no  - Date of images: 5/25/2020 and 5/26/2020  - Service start time:3:53pm  - Service end time:4:43pm  - Date of report: 5/26/2020

## 2020-05-26 NOTE — TELEPHONE ENCOUNTER
Zuleyka Enamorado MD  [facility] Dermatology Adult [place]               Photos and phone 6 weeks      Left message for patient to call  [facility] [employer] [facility] in [place] back at [phone] to make follow up appointment for hair loss.    Dee Chavarria LPN

## 2020-05-26 NOTE — Clinical Note
6 weeks photos phone from [facility] that was about 3 weeks ago (so needs visit 3 weeks from now)

## 2020-05-26 NOTE — PATIENT INSTRUCTIONS
[facility] Teledermatology Visit    Thank you for allowing us to participate in your care. Your findings, instructions and follow-up plan are as follows:   -consider Rogaine, see below    When should I call my doctor?    If you are worsening or not improving, please, contact us or seek urgent care as noted below.     Who should I call with questions (adults)?    [facility] (adult and pediatric): [phone]     [facility] (adult): [phone]    For urgent needs outside of business hours call the [facility] at [phone] and ask for the dermatology resident on call    If this is a medical emergency and you are unable to reach an ER, Call [phone]      Who should I call with questions (pediatric)?  [facility]- Pediatric Dermatology  Dr. Olena Mancilla, Dr. Carrillo Mahmood, Dr. Leta Rocha, Acacia Aguiar, PA  Dr. Jeniffer Ramirez, Dr. Ama Farah & Dr. Ej Cordova  Non Urgent  Nurse Triage Line; [phone]- Coleen and Eve WILLSON Care Coordinators   Clari (/Complex ) [phone]    If you need a prescription refill, please contact your pharmacy. Refills are approved or denied by our Physicians during normal business hours, Monday through Fridays  Per office policy, refills will not be granted if you have not been seen within the past year (or sooner depending on your child's condition)    Scheduling Information:  Pediatric Appointment Scheduling and Call Center [phone]  Radiology Scheduling- [phone]  Sedation Unit Scheduling- [phone]  [place] Scheduling- General [phone]; Pediatric Dermatology [phone]  Main  Services: [phone]  [place]: [phone]  Citizen of [place]: [phone]  Hmong/[place]yrgyz/[place]: [phone]  Preadmission Nursing Department Fax Number: [phone] (Fax all pre-operative paperwork to this number)    For urgent  matters arising during evenings, weekends, or holidays that cannot wait for normal business hours please call [phone] and ask for the Dermatology Resident On-Call to be paged.      Topical Rogaine (Minoxidil) for Pattern Hair Loss      Minoxidil is an FDA approved over the counter topical for the treatment of hair loss and thinning hair in men and women.     Initially a 2% solution was available however this required application twice daily. A 5% solution is also now approved, only requiring application once per day.     Available Products:     Rogaine 5% solution: Packaged for men however can be used by men or women. Use dropper and apply directly to scalp at bedtime. This product can cause an allergy because of presence of propylene glycol. Stop this product if you develop a rash or itching and contact your physician.     Rogaine 5% foam: Packaged for men and women: Apply foam directly to the scalp once daily. This is less greasy compared to the solution. This formula is preferred for those who had a reaction to the solution product. If you develop rash or itching, stop the product and contact your physician.     What if I stop minoxidil topical?     After stopping minoxidil the hair will return to the usual pattern of thinning. Using the product 3-4 times per week is better than not using product at all.       Can I use generic minoxidil?     Yes, look for 5% minoxidil.     What are the side effects?    The most common side effect is rash or itching of the scalp. This can occur if a contact allergy develops with propylene glycol. A small group of patients noticed the appearance of facial hair if the product runs onto the face or with prolonged use.       Last updated: 6/26/2016

## 2020-07-09 ENCOUNTER — VIRTUAL VISIT (OUTPATIENT)
Dept: DERMATOLOGY | Facility: CLINIC | Age: 35
End: 2020-07-09
Payer: COMMERCIAL

## 2020-07-09 DIAGNOSIS — L65.9 ALOPECIA: Primary | ICD-10-CM

## 2020-07-09 PROCEDURE — 99213 OFFICE O/P EST LOW 20 MIN: CPT | Mod: 95 | Performed by: DERMATOLOGY

## 2020-07-09 ASSESSMENT — PAIN SCALES - GENERAL: PAINLEVEL: NO PAIN (0)

## 2020-07-09 NOTE — PROGRESS NOTES
[facility]  Store and Forward and Telephone [phone]      Impression and Recommendations (Patient Counseled on the Following):  1. 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 Ferritin 12 8/2019 and unable to tolerate supplement after 1 month due to GI upset, Taking vitamin D. Last TSH 8/2019 was normal. Taking hair skin and nails product OTC. Since last visit hair breakage was removed by cut. Pt feels issues is resolved except on focal thinner area left temporal scalp at site of part  -recommend moving part when able, avoid trauma to this area(may be area of androgenetic alopecia, increased breakage or early alopecia areata)  -Start Rogaine 5% once daily for mild frontotemporal thinning- 3months, not pregnant, keep away from face      Follow-up:   Follow-up with dermatology in approximately 3 months photos and phone. Earlier for new or changing lesions or rash.      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 Gretchen De Jesus is a 35 year old female who presents via teledermatology for hair breakage. She reports  cut off breaking area. NO loss or breakage but still some thinning on left side wear the part is.            ROS: Patient is generally feeling well today     Physical Examination:  General: Well-appearing , appropriately-developed individual.  Skin: Focused examination within the teledermatology photograph(s) including scalp was performed.   - left sided part with thinning left temporal skin  - normal nails  Labs:  NA    Past Medical History:   Patient Active Problem List   Diagnosis     CARDIOVASCULAR SCREENING; LDL GOAL LESS THAN 160     HSIL (high grade squamous intraepithelial lesion) on Pap smear     Other disorder of menstruation and other abnormal bleeding from female genital tract     Past Medical History:   Diagnosis Date     Abnormal glandular Papanicolaou smear of cervix     Abn. Pap smear (cervix)     Allergic rhinitis, cause unspecified     Allergic rhinitis     Cervical dysplasia      Hypoglycemia      Migraine      Other spontaneous pneumothorax 09/14/06    D/C 09/17/06-left spontaneous pneumothorax     PCOS (polycystic ovarian syndrome)      Past Surgical History:   Procedure Laterality Date     CONIZATION LEEP  05/12/08    LEEP     DILATION AND CURETTAGE, HYSTEROSCOPY DIAGNOSTIC, COMBINED N/A 3/25/2019    Procedure: Hysteroscopy, Dilatation and Curettage;  Surgeon: Eamon Rodriges MD;  [facility]     HC EXCIS PRIMARY GANGLION WRIST  7/28/10     HC EXCISION TENDON SHEATH LESION, HAND/FINGR  09/15/10    left hand     HYSTEROSCOPY  02/01/10     and dilation and curettage and removal of uterine polyp     SURGICAL HISTORY OF -   age 3    ?eye surgery       Social History:  Patient reports that she quit smoking about 16 years ago. She has a 1.50 pack-year smoking history. She has never used smokeless tobacco. She reports current alcohol use. She reports that she does not use drugs.    Family History:  Family History   Problem Relation Age of Onset     Neurologic Disorder Mother         brain, fibromyalgia     Cancer Maternal Grandfather         skin     Heart Disease Maternal  "Grandfather      Breast Cancer Maternal Grandmother      Heart Disease Paternal Grandfather      Diabetes Paternal Grandfather      Cerebrovascular Disease Paternal Grandfather      Breast Cancer Maternal Aunt        Medications:  Current Outpatient Medications   Medication     Vitamin D, Cholecalciferol, 1000 units CAPS     vitamin D3 (CHOLECALCIFEROL) 2000 units (50 mcg) tablet     ferrous gluconate (FERGON) 324 (38 Fe) MG tablet     UNABLE TO FIND     No current facility-administered medications for this visit.           No Known Allergies      _____________________________________________________________________________    Teledermatology information:  - Location of patient: [place]  - Patient presented as: return  - Location of teledermatologist:  ([facility] )  - Reason teledermatology is appropriate:  of National Emergency Regarding Coronavirus disease (COVID 19) Outbreak  - Image quality and interpretability: acceptable  - Physician has received verbal consent for a Video/Photos Visit from the patient? Yes  - In-person dermatology visit recommendation: no  - Date of images: 7/9/2020  - Service start time: approx 9:40am  - Service end time:9:49am  - Date of report: 7/9/2020           Teledermatology Nurse Call for RETURN patients seen within the last 3 years:    The patient was contacted by phone and we reviewed, \"Due to the coronavirus pandemic, we are calling to review your visit and offer you a teledermatology visit where you send in photos via [employer]. These photos will be seen by an MD or PAKELSEY. This will be billed to you and your insurance.\"  The patient was also told that \"a teledermatology visit is not as thorough as an in-person visit and that the quality of the photograph sent may not be of the same quality as that taken by the dermatology clinic, but the patient would like to proceed with an teledermatology because of National Emergency Regarding Coronavirus disease (COVID 19) " "Outbreak.\"  \"If a prescription is necessary we can send it directly to your pharmacy.  If lab work is needed we can place an order for that and you can then stop by our lab to have the test done at a later time.\"    The patient understood that they may receive a call from the clinic to review additional history, may still be instructed to come to clinic even after photo review and be billed for both visits with an MD. Visits are billed at different rates depending on your insurance coverage. Please reach out to your insurance provider with any questions.They were told that a photo assessment does not replace an in person skin exam. The patient understood that teledermatology is not for urgent issues and would require up to 3 business days for review. The patient denied skin pain, fever, mucosal symptoms (lesions, blisters, sores in the mouth, nose, eyes, or genitals)  IF PATIENT ENDORSES ANY OF THESE STOP AND PAGE  ON CALL ATTENDING. IF OTHER POSSIBLY URGENT SYMPTOMS THEN PAGE PHYSICIAN YOU ARE SCHEDULING WITH OR ON CALL IF NO ANSWER.       The patient chose to:                                                                                                                                                                                                                    Consent to a teledermatology visit with mychart photos. The patient understood they must upload a mychart photo for this visit to be completed. They indicated that the photo will be taken at their home address(if other address please document here). Patient told photos taken at work due to better lighting .  The patient was instructed to take photos of all all areas of concern and all areas of any rash from near and far away.                                                                                                                                                                                                                               The " patient is verified to be in the state [facility] at the time of the encounter.     The physician must be notified by nurse if the patient is not in the state at the time of the encounterDELETE THIS PHRASE FROM NOTE AFTER COMPLETING IF NEEDED                                                                                                                                                                                                            Patient concerns for this return visit:   Chief Complaint   Patient presents with     Derm Problem     Alopecia f/u    Mild frontotemporal thinning. She have not started using Rogaine; still using head and shoulders. She cut her hair short and hair is healthy.   She have not noticed hair fall out or breakage since the hair cut.         Photo instructions reviewed with patients as below:  -ALL patient needs to send photos unless they have a phone only visit approved by the clinic:  o To send photos to your doctor, respond to the message in [employer] as many times as needed to upload your photos. Each message allows for 3 photos.  o For spots or lesions of concern, please take at least 2 photos of each site you are worried about (at different angles if needed, and at least one close up and one farther away so we can tell where it is on the body. Be sure all photographs are in focus)  o For rashes, take photos of the entire body because it is important for us to see which areas are involved and which areas are not involved.  At a minimum, please include photos of the arms, legs, front of trunk, back of trunk, face, and a few close-ups of the rash.  Leave undergarments in place unless the rash involves the skin in these areas  o For acne, please take photos of the face, upper chest and neck, and upper chest and back  o For hair loss, please send views of the top of the head, sides of the head, back of the head and a picture of your face with your hair pulled back. Also, a photos of  both hands with nails.    -For ADULT NEW patients video visits are needed, to receive an invitation and connect with your provider, the [email or web address] video visit technology must be accessible from your smartphone or personal device. Please click the link below for setup instructions: [email or web address]    Nursing tasks completed  -Pharmacy preference was updated.  -The nurse has dropped in the AVS information *(For adults the phrase is umdermhteleavs and for pediatrics it is their own) for the physician to route in the AVS.                                                                                                                                                                                                                         -The patient was told to contact the clinic if they have not received correspondence within 72 hours.

## 2020-07-09 NOTE — LETTER
7/9/2020         RE: Gretchen De Jesus  [address] [place] [address]        Dear Colleague,    Thank you for referring your patient, Gretchen De Jesus, to the [facility]. Please see a copy of my visit note below.    [facility]  Store and Forward and Telephone [phone]      Impression and Recommendations (Patient Counseled on the Following):  1. 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 Ferritin 12 8/2019 and unable to tolerate supplement after 1 month due to GI upset, Taking vitamin D. Last TSH 8/2019 was normal. Taking hair skin and nails product OTC. Since last visit hair breakage was removed by cut. Pt feels issues is resolved except on focal thinner area left temporal scalp at site of part  -recommend moving part when able, avoid trauma to this area(may be area of androgenetic alopecia, increased breakage or early alopecia areata)  -Start Rogaine 5% once daily for mild frontotemporal thinning- 3months, not pregnant, keep away from face      Follow-up:   Follow-up with dermatology in approximately 3 months photos and phone. Earlier for new or changing lesions or rash.      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 Gretchen De Jesus is a 35 year old female who presents via teledermatology for hair breakage. She reports  cut off breaking area. NO loss or breakage but still some thinning on left side wear the part is.           ROS: Patient is generally feeling well today     Physical Examination:  General: Well-appearing , appropriately-developed individual.  Skin: Focused examination within the teledermatology photograph(s) including scalp was performed.   - left sided part with thinning left temporal skin  - normal nails  Labs:  NA    Past Medical History:   Patient Active Problem List   Diagnosis     CARDIOVASCULAR SCREENING; LDL GOAL LESS THAN 160     HSIL (high grade squamous intraepithelial lesion) on Pap smear     Other disorder of menstruation and other abnormal bleeding from female genital tract     Past Medical History:   Diagnosis Date     Abnormal glandular Papanicolaou smear of cervix     Abn. Pap smear (cervix)     Allergic rhinitis, cause unspecified     Allergic rhinitis     Cervical dysplasia      Hypoglycemia      Migraine      Other spontaneous pneumothorax 09/14/06    D/C 09/17/06-left spontaneous pneumothorax     PCOS (polycystic ovarian syndrome)      Past Surgical History:   Procedure Laterality Date     CONIZATION LEEP  05/12/08    LEEP     DILATION AND CURETTAGE, HYSTEROSCOPY DIAGNOSTIC, COMBINED N/A 3/25/2019    Procedure: Hysteroscopy, Dilatation and Curettage;  Surgeon: Eamon Rodriges MD;  Location: [facility] OR     HC EXCIS PRIMARY GANGLION WRIST  7/28/10     HC EXCISION TENDON SHEATH LESION, HAND/FINGR  09/15/10    left hand     HYSTEROSCOPY  02/01/10     and dilation and curettage and removal of uterine polyp     SURGICAL HISTORY OF -   age 3    ?eye surgery       Social History:  Patient reports that she quit smoking about 16 years ago. She has a 1.50 pack-year smoking history. She has never used smokeless tobacco. She reports  "current alcohol use. She reports that she does not use drugs.    Family History:  Family History   Problem Relation Age of Onset     Neurologic Disorder Mother         brain, fibromyalgia     Cancer Maternal Grandfather         skin     Heart Disease Maternal Grandfather      Breast Cancer Maternal Grandmother      Heart Disease Paternal Grandfather      Diabetes Paternal Grandfather      Cerebrovascular Disease Paternal Grandfather      Breast Cancer Maternal Aunt        Medications:  Current Outpatient Medications   Medication     Vitamin D, Cholecalciferol, 1000 units CAPS     vitamin D3 (CHOLECALCIFEROL) 2000 units (50 mcg) tablet     ferrous gluconate (FERGON) 324 (38 Fe) MG tablet     UNABLE TO FIND     No current facility-administered medications for this visit.           No Known Allergies      _____________________________________________________________________________    Teledermatology information:  - Location of patient: [place]  - Patient presented as: return  - Location of teledermatologist:  ([facility] )  - Reason teledermatology is appropriate:  of National Emergency Regarding Coronavirus disease (COVID 19) Outbreak  - Image quality and interpretability: acceptable  - Physician has received verbal consent for a Video/Photos Visit from the patient? Yes  - In-person dermatology visit recommendation: no  - Date of images: 7/9/2020  - Service start time: approx 9:40am  - Service end time:9:49am  - Date of report: 7/9/2020           Teledermatology Nurse Call for RETURN patients seen within the last 3 years:    The patient was contacted by phone and we reviewed, \"Due to the coronavirus pandemic, we are calling to review your visit and offer you a teledermatology visit where you send in photos via Pumant. These photos will be seen by an MD or MAXIMILIANO. This will be billed to you and your insurance.\"  The patient was also told that \"a teledermatology visit is not as thorough as an " "in-person visit and that the quality of the photograph sent may not be of the same quality as that taken by the dermatology clinic, but the patient would like to proceed with an teledermatology because of National Emergency Regarding Coronavirus disease (COVID 19) Outbreak.\"  \"If a prescription is necessary we can send it directly to your pharmacy.  If lab work is needed we can place an order for that and you can then stop by our lab to have the test done at a later time.\"    The patient understood that they may receive a call from the clinic to review additional history, may still be instructed to come to clinic even after photo review and be billed for both visits with an MD. Visits are billed at different rates depending on your insurance coverage. Please reach out to your insurance provider with any questions.They were told that a photo assessment does not replace an in person skin exam. The patient understood that teledermatology is not for urgent issues and would require up to 3 business days for review. The patient denied skin pain, fever, mucosal symptoms (lesions, blisters, sores in the mouth, nose, eyes, or genitals)  IF PATIENT ENDORSES ANY OF THESE STOP AND PAGE  ON CALL ATTENDING. IF OTHER POSSIBLY URGENT SYMPTOMS THEN PAGE PHYSICIAN YOU ARE SCHEDULING WITH OR ON CALL IF NO ANSWER.       The patient chose to:                                                                                                                                                                                                                    Consent to a teledermatology visit with mychart photos. The patient understood they must upload a mychart photo for this visit to be completed. They indicated that the photo will be taken at their home address(if other address please document here). Patient told photos taken at work due to better lighting .  The patient was instructed to take photos of all all areas of concern and all areas " of any rash from near and far away.                                                                                                                                                                                                                               The patient is verified to be in the state of [place] at the time of the encounter.     The physician must be notified by nurse if the patient is not in the state at the time of the encounterDELETE THIS PHRASE FROM NOTE AFTER COMPLETING IF NEEDED                                                                                                                                                                                                            Patient concerns for this return visit:   Chief Complaint   Patient presents with     Derm Problem     Alopecia f/u    Mild frontotemporal thinning. She have not started using Rogaine; still using head and shoulders. She cut her hair short and hair is healthy.   She have not noticed hair fall out or breakage since the hair cut.         Photo instructions reviewed with patients as below:  -ALL patient needs to send photos unless they have a phone only visit approved by the clinic:  o To send photos to your doctor, respond to the message in Socogame as many times as needed to upload your photos. Each message allows for 3 photos.  o For spots or lesions of concern, please take at least 2 photos of each site you are worried about (at different angles if needed, and at least one close up and one farther away so we can tell where it is on the body. Be sure all photographs are in focus)  o For rashes, take photos of the entire body because it is important for us to see which areas are involved and which areas are not involved.  At a minimum, please include photos of the arms, legs, front of trunk, back of trunk, face, and a few close-ups of the rash.  Leave undergarments in place unless the rash involves the skin in these  areas  o For acne, please take photos of the face, upper chest and neck, and upper chest and back  o For hair loss, please send views of the top of the head, sides of the head, back of the head and a picture of your face with your hair pulled back. Also, a photos of both hands with nails.    -For ADULT NEW patients video visits are needed, to receive an invitation and connect with your provider, the TwoChop video visit technology must be accessible from your smartphone or personal device. Please click the link below for setup instructions: [email or web address]    Nursing tasks completed  -Pharmacy preference was updated.  -The nurse has dropped in the AVS information *(For adults the phrase is umdermhteleavs and for pediatrics it is their own) for the physician to route in the AVS.                                                                                                                                                                                                                         -The patient was told to contact the clinic if they have not received correspondence within 72 hours.               Again, thank you for allowing me to participate in the care of your patient.        Sincerely,        Zuleyka Enamorado MD

## 2020-07-09 NOTE — PATIENT INSTRUCTIONS
[facility] Teledermatology Visit    Thank you for allowing us to participate in your care. Your findings, instructions and follow-up plan are as follows:  Rogaine once daily left area of scalp    When should I call my doctor?    If you are worsening or not improving, please, contact us or seek urgent care as noted below.     Who should I call with questions (adults)?    [facility] (adult and pediatric): [phone]     [facility] (adult): [phone]    For urgent needs outside of business hours call the [facility] at [phone] and ask for the dermatology resident on call    If this is a medical emergency and you are unable to reach an ER, Call [phone]      Who should I call with questions (pediatric)?  [facility]- Pediatric Dermatology  Dr. Olena Mancilla, Dr. Carrillo Mahmood, Dr. Leta Rocha, Acacia Aguiar, [place]  Dr. Jeniffer Ramirez, Dr. Ama Farah & Dr. Ej Cordova  Non Urgent  Nurse Triage Line; [phone]- Coleen and Eve WILLSON Care Coordinators   Clari (/Complex ) [phone]    If you need a prescription refill, please contact your pharmacy. Refills are approved or denied by our Physicians during normal business hours, Monday through Fridays  Per office policy, refills will not be granted if you have not been seen within the past year (or sooner depending on your child's condition)    Scheduling Information:  Pediatric Appointment Scheduling and Call Center [phone]  Radiology Scheduling- [phone]  Sedation Unit Scheduling- [phone]  [place] Scheduling- General [phone]; Pediatric Dermatology [phone]  Main  Services: [phone]  Greek: [phone]  P[place]an: [phone]  Hmong/Orlin/Croatian: [phone]  Preadmission Nursing Department Fax Number: [phone] (Fax all pre-operative paperwork to this number)    For  urgent matters arising during evenings, weekends, or holidays that cannot wait for normal business hours please call [phone] and ask for the Dermatology Resident On-Call to be paged.

## 2020-09-21 ENCOUNTER — TELEPHONE (OUTPATIENT)
Dept: DERMATOLOGY | Facility: CLINIC | Age: 35
End: 2020-09-21

## 2020-09-21 NOTE — TELEPHONE ENCOUNTER
9/21 Called and left voicemail. Explained we need to reschedule appointment on 10/13.     Cary Atkinson   Procedure    Ortho/Sports Med/Pod/Ent/Eye/Surgical Specialties  [facility] [place]   [phone]

## 2020-09-28 NOTE — TELEPHONE ENCOUNTER
9/28 Called and left voicemail. Explained we need to reschedule appointment on 10/13.      Cary Atkinson          Procedure    Ortho/Sports Med/Pod/Ent/Eye/Surgical Specialties  [facility] [place]   [phone]

## 2020-11-29 ENCOUNTER — HEALTH MAINTENANCE LETTER (OUTPATIENT)
Age: 35
End: 2020-11-29

## 2020-12-22 NOTE — ADDENDUM NOTE
Addended by: BOSSMAN ZAZUETA on: 10/31/2018 11:18 AM     Modules accepted: Orders     Detail Level: Simple Additional Notes: Patient consent was obtained to proceed with the visit and recommended plan of care after discussion of all risks and benefits, including the risks of COVID-19 exposure.

## 2021-04-10 ENCOUNTER — HEALTH MAINTENANCE LETTER (OUTPATIENT)
Age: 36
End: 2021-04-10

## 2021-09-19 ENCOUNTER — HEALTH MAINTENANCE LETTER (OUTPATIENT)
Age: 36
End: 2021-09-19

## 2022-02-04 ENCOUNTER — E-VISIT (OUTPATIENT)
Dept: URGENT CARE | Facility: CLINIC | Age: 37
End: 2022-02-04
Payer: COMMERCIAL

## 2022-02-04 DIAGNOSIS — Z20.822 SUSPECTED COVID-19 VIRUS INFECTION: Primary | ICD-10-CM

## 2022-02-04 PROCEDURE — 99421 OL DIG E/M SVC 5-10 MIN: CPT | Performed by: PHYSICIAN ASSISTANT

## 2022-02-05 NOTE — PATIENT INSTRUCTIONS
Gretchen,    Your symptoms show that you may have coronavirus (COVID-19). This illness can cause fever, cough and trouble breathing. Many people get a mild case and get better on their own. Some people can get very sick.    Because you reported additional symptoms, I would like to also test you for flu and strep.    What should I do?  We would like to test you for COVID-19 virus and strep and flu. I have placed orders for these tests. To schedule: go to your Ranker home page and scroll down to the section that says  You have an appointment that needs to be scheduled  and click the large green button that says  Schedule Now  and follow the steps to find the next available openings. It is important that when you are asked what the reason for your appointment is that you mention you need BOTH COVID and strep and flu tests.    If you are unable to complete these Ranker scheduling steps, please call [phone] to schedule your testing.     Return to work/school/ guidance:   Please let your workplace manager and staffing office know when your isolation ends.       If you receive a positive COVID-19 test result, follow the guidance of the those who are giving you the results. Usually the return to work is 10 days from symptom onset or positive test date, whichever comes first (or in some cases 20 days if you are immunocompromised). If your symptoms started after your positive test, the 10 days should start when your symptoms started.   o If you work at [facility] [employer] [facility] [employer], you must also be cleared by Employee Occupational Health and Safety to return to work.      If you receive a negative COVID-19 test result and did not have a high risk exposure to someone with a known positive COVID-19 test, you can return to work once you're free of fever for 24 hours without fever-reducing medication and your symptoms are improving or resolved.    If you receive a negative COVID-19 test and if you had a high risk exposure to  someone who has tested positive for COVID-19 then you can return to work 14 days after your last contact with the positive individual. Follow quarantine guidance given by your doctor or public health officials.    Sign up for Tepha.   We know it's scary to hear that you might have COVID-19. We want to track your symptoms to make sure you're okay over the next 2 weeks. Please look for an email from Tepha--this is a free, online program that we'll use to keep in touch. To sign up, follow the link in the email you will receive. Learn more at [email or web address]    How can I take care of myself?  Over the counter medications may help with your symptoms like congestion, cough, chills, or fever.    For fever and aches: Ibuprofen 4-600 mg (2-3 of the 200 mg OTC tablets or 4-600 mg of the children's liquid) up to 4  times daily with food or milk. And rotate with Tylenol 500-1000 mg every 8 hours as needed    Mucinex (guafenesin) for cough and congestion. Can also use Dextromethorphan    For nasal congestion and runny nose: Flonase/fluticasone nasal spray 2 sprays in each nostril once a day for 1 - 4 weeks.  This may take several days to become effective. Consider saline nasal rinses. Psuedofed can help if you tolerate it but do  not take if you have high blood pressure      There are not many effective prescription treatments for early COVID-19. Hydroxychloroquine, ivermectin, and azithromycin are not effective or recommended for COVID-19.    If your symptoms started in the last 10 days, you may be able to receive a treatment with monoclonal antibodies. This treatment can lower your risk of severe illness and going to the hospital. It is given through an IV or under your skin (subcutaneous) and must be given at an infusion center. You must be 12 or older, weight at least 88 pounds, and have a positive COVID-19 test.      If you would like to sign up to be considered to receive the monoclonal  antibody medicine, please complete a participation form through the [place] Department of Health here:  MNRAP ([email or web address]). You may also call the [facility] COVID-19 Public Hotline at 1-138.150.2898 (open Mon-Fri: 9am-7pm and Sat: 10am-6pm).     Not all people who are eligible will receive the medicine, since supply is limited. You will be contacted in the next 1 to 2 business days only if you are selected. If you do not receive a call, you have not been selected to receive the medicine. If you have any questions about this medication, please contact your primary care provider. For more information, see [email or web address]      Get lots of rest. Drink extra fluids (unless a doctor has told you not to)    Take Tylenol (acetaminophen) or ibuprofen for fever or pain. If you have liver or kidney problems, ask your family doctor if it's okay to take Tylenol or ibuprofen    Take over the counter medications for your symptoms, as directed by your doctor. You may also talk to your pharmacist.      If you have other health problems (like cancer, heart failure, an organ transplant or severe kidney disease): Call your specialty clinic if you don't feel better in the next 2 days.    Know when to call 911. Emergency warning signs include:  o Trouble breathing or shortness of breath  o Pain or pressure in the chest that doesn't go away  o Feeling confused like you haven't felt before, or not being able to wake up  o Bluish-colored lips or face    Where can I get more information?    [facility] - About COVID-19: [email or web address]     CDC - What to Do If You're Sick:   www.cdc.gov/coronavirus/2019-ncov/about/steps-when-sick.html    CDC - Ending Home Isolation:  https://www.cdc.gov/coronavirus/2019-ncov/your-health/quarantine-isolation.html    CDC - Caring for Someone:   www.cdc.gov/coronavirus/2019-ncov/if-you-are-sick/care-for-someone.html    [facility] clinical trials (COVID-19 research studies): clinicalaffairs.[facility]-clinical-trials    Below are the COVID-19 hotlines at the [place] Department of Health ([facility]). Interpreters are available.  o For health questions: Call 054-456-4452 or 1-758.246.4696 (7 a.m. to 7 p.m.)  o For questions about schools and childcare: Call 366-738-0634 or 1-264.328.7859 (7 a.m. to 7 p.m.)  February 4, 2022  RE:  Gretchne De Jesus                                                                                                                  [address] [place] [address]      To whom it may concern:    I evaluated Gretchen K L Bjcésar on February 4, 2022. Gretchen De Jesus should be excused from work/school.     They should let their workplace manager and staffing office know when their quarantine ends.    We can not give an exact date as it depends on the information below. They can calculate this on their own or work with their manager/staffing office to calculate this. (For example if they were exposed on 10/04, they would have to quarantine for 14 full days. That would be through 10/18. They could return on 10/19.)    Quarantine Guidelines:    If patient receives a positive COVID-19 test result, they should follow the guidance of those who are giving the results. Usually the return to work is 10 (or in some cases 20 days from symptom onset.) If they work at [employer], they must be cleared by Employee Occupational Health and Safety to return to work.      If patient receives a negative COVID-19 test result and did not have a high risk exposure to someone with a known positive COVID-19 test, they can return to work once they're free of fever for 24 hours without fever-reducing medication and their symptoms are improving or resolved.    If patient receives a negative COVID-19 test and if they had a  high risk exposure to someone who has tested positive for COVID-19 then they can return to work 14 days after their last contact with the positive individual    Note: If there is ongoing exposure to the covid positive person, this quarantine period may be longer than 14 days. (For example, if they are continually exposed to their child, who tested positive and cannot isolate from them, then the quarantine of 7-14 days can't start until their child is no longer contagious. This is typically 10 days from onset to the child's symptoms. So the total duration may be 17-24 days in this case.)     Sincerely,  Joni Ortiz PA-C          o

## 2022-02-06 ENCOUNTER — LAB (OUTPATIENT)
Dept: FAMILY MEDICINE | Facility: CLINIC | Age: 37
End: 2022-02-06
Attending: PHYSICIAN ASSISTANT
Payer: COMMERCIAL

## 2022-02-06 DIAGNOSIS — Z20.822 SUSPECTED COVID-19 VIRUS INFECTION: ICD-10-CM

## 2022-02-06 LAB
DEPRECATED S PYO AG THROAT QL EIA: NEGATIVE
FLUAV AG SPEC QL IA: NEGATIVE
FLUBV AG SPEC QL IA: NEGATIVE
GROUP A STREP BY PCR: NOT DETECTED

## 2022-02-06 PROCEDURE — 87651 STREP A DNA AMP PROBE: CPT

## 2022-02-06 PROCEDURE — 87804 INFLUENZA ASSAY W/OPTIC: CPT

## 2022-02-06 PROCEDURE — U0005 INFEC AGEN DETEC AMPLI PROBE: HCPCS

## 2022-02-06 PROCEDURE — U0003 INFECTIOUS AGENT DETECTION BY NUCLEIC ACID (DNA OR RNA); SEVERE ACUTE RESPIRATORY SYNDROME CORONAVIRUS 2 (SARS-COV-2) (CORONAVIRUS DISEASE [COVID-19]), AMPLIFIED PROBE TECHNIQUE, MAKING USE OF HIGH THROUGHPUT TECHNOLOGIES AS DESCRIBED BY CMS-2020-01-R: HCPCS

## 2022-02-07 LAB — SARS-COV-2 RNA RESP QL NAA+PROBE: NEGATIVE

## 2022-05-01 ENCOUNTER — HEALTH MAINTENANCE LETTER (OUTPATIENT)
Age: 37
End: 2022-05-01

## 2022-05-06 ENCOUNTER — HOSPITAL ENCOUNTER (EMERGENCY)
Facility: CLINIC | Age: 37
Discharge: HOME | End: 2022-05-06
Attending: EMERGENCY MEDICINE | Admitting: EMERGENCY MEDICINE
Payer: COMMERCIAL

## 2022-05-06 VITALS
HEART RATE: 89 BPM | RESPIRATION RATE: 16 BRPM | OXYGEN SATURATION: 96 % | WEIGHT: 163.1 LBS | BODY MASS INDEX: 30.32 KG/M2 | TEMPERATURE: 97.8 F | DIASTOLIC BLOOD PRESSURE: 76 MMHG | SYSTOLIC BLOOD PRESSURE: 144 MMHG

## 2022-05-06 DIAGNOSIS — M54.50 ACUTE BILATERAL LOW BACK PAIN WITHOUT SCIATICA: ICD-10-CM

## 2022-05-06 PROCEDURE — 250N000013 HC RX MED GY IP 250 OP 250 PS 637: Performed by: EMERGENCY MEDICINE

## 2022-05-06 PROCEDURE — 96372 THER/PROPH/DIAG INJ SC/IM: CPT | Performed by: EMERGENCY MEDICINE

## 2022-05-06 PROCEDURE — 99284 EMERGENCY DEPT VISIT MOD MDM: CPT | Performed by: EMERGENCY MEDICINE

## 2022-05-06 PROCEDURE — 250N000011 HC RX IP 250 OP 636: Performed by: EMERGENCY MEDICINE

## 2022-05-06 PROCEDURE — 99284 EMERGENCY DEPT VISIT MOD MDM: CPT | Mod: 25

## 2022-05-06 RX ORDER — CYCLOBENZAPRINE HCL 10 MG
10 TABLET ORAL 3 TIMES DAILY PRN
Qty: 20 TABLET | Refills: 0 | Status: SHIPPED | OUTPATIENT
Start: 2022-05-06 | End: 2022-05-12

## 2022-05-06 RX ORDER — HYDROCODONE BITARTRATE AND ACETAMINOPHEN 5; 325 MG/1; MG/1
1 TABLET ORAL EVERY 6 HOURS PRN
Qty: 10 TABLET | Refills: 0 | Status: SHIPPED | OUTPATIENT
Start: 2022-05-06 | End: 2022-05-09

## 2022-05-06 RX ORDER — ONDANSETRON 4 MG/1
4 TABLET, ORALLY DISINTEGRATING ORAL EVERY 6 HOURS PRN
Qty: 20 TABLET | Refills: 0 | Status: SHIPPED | OUTPATIENT
Start: 2022-05-06 | End: 2023-08-08

## 2022-05-06 RX ORDER — ONDANSETRON 4 MG/1
4 TABLET, ORALLY DISINTEGRATING ORAL ONCE
Status: COMPLETED | OUTPATIENT
Start: 2022-05-06 | End: 2022-05-06

## 2022-05-06 RX ORDER — HYDROCODONE BITARTRATE AND ACETAMINOPHEN 5; 325 MG/1; MG/1
1 TABLET ORAL ONCE
Status: COMPLETED | OUTPATIENT
Start: 2022-05-06 | End: 2022-05-06

## 2022-05-06 RX ORDER — ORPHENADRINE CITRATE 30 MG/ML
60 INJECTION INTRAMUSCULAR; INTRAVENOUS ONCE
Status: COMPLETED | OUTPATIENT
Start: 2022-05-06 | End: 2022-05-06

## 2022-05-06 RX ADMIN — HYDROCODONE BITARTRATE AND ACETAMINOPHEN 1 TABLET: 5; 325 TABLET ORAL at 08:49

## 2022-05-06 RX ADMIN — ORPHENADRINE CITRATE 60 MG: 30 INJECTION INTRAMUSCULAR; INTRAVENOUS at 08:49

## 2022-05-06 RX ADMIN — ONDANSETRON 4 MG: 4 TABLET, ORALLY DISINTEGRATING ORAL at 09:13

## 2022-05-06 NOTE — ED PROVIDER NOTES
History     Chief Complaint   Patient presents with     Back Pain     HPI  Gretchen De Jesus is a 37 year old female who presents to the emergency room with low back pain.  She says that she noticed it on Monday and it has gotten progressively worse throughout the week.  Said that last weekend she just stayed on the couch and was playing video games, no overexertion or known injury.  Woke up on Monday with low back pain but says that it was mild.  Has been getting worse as the week progresses.  Says that it goes across her whole lower back and down into her buttocks, but does not go down into her legs.  Has been taking Tylenol and ibuprofen, took ibuprofen at 630 this morning around 400 mg, but this did not help.  Has not previously had any issues with her back.  Went to see a chiropractor the other day, and adjustment helped for a short period of time, but then got worse.  Denies any bowel or bladder incontinence, no lower extremity numbness or tingling    Allergies:  No Known Allergies    Problem List:    Patient Active Problem List    Diagnosis Date Noted     Other disorder of menstruation and other abnormal bleeding from female genital tract 11/14/2014     Priority: Medium     HSIL (high grade squamous intraepithelial lesion) on Pap smear 01/16/2012     Priority: Medium     9/10/07 HSIL pap smear.  5/12/08 [facility] LEEP  1/19/10 pap NIL  1/19/12 pap NIL  6/28/13 NIL  8/14/14 NIL pap, neg HR HPV. Plan to repeat pap/HPV in 1 year  7/20/15 NIL pap, neg HR HPV. Plan: cotest in 3 yrs  12/27/16 NIL pap, neg HR HPV. Plan: cotest in 3 yrs.  7/13/17 NIL pap in Care Everywhere.  10/31/18 NIL pap, neg HR HPV. Plan: cotest in 3 yrs            CARDIOVASCULAR SCREENING; LDL GOAL LESS THAN 160 10/31/2010     Priority: Medium        Past Medical History:    Past Medical History:   Diagnosis Date     Abnormal glandular Papanicolaou smear of cervix      Allergic rhinitis, cause unspecified      Cervical dysplasia       Hypoglycemia      Migraine      Other spontaneous pneumothorax 06     PCOS (polycystic ovarian syndrome)        Past Surgical History:    Past Surgical History:   Procedure Laterality Date     CONIZATION LEEP  08    LEEP     DILATION AND CURETTAGE, HYSTEROSCOPY DIAGNOSTIC, COMBINED N/A 3/25/2019    Procedure: Hysteroscopy, Dilatation and Curettage;  Surgeon: Eamon Rodriges MD;  Location: [facility] OR     HC EXCIS PRIMARY GANGLION WRIST  7/28/10     HC EXCISION TENDON SHEATH LESION, HAND/FINGR  09/15/10    left hand     HYSTEROSCOPY  02/01/10     and dilation and curettage and removal of uterine polyp     SURGICAL HISTORY OF -   age 3    ?eye surgery       Family History:    Family History   Problem Relation Age of Onset     Neurologic Disorder Mother         brain, fibromyalgia     Cancer Maternal Grandfather         skin     Heart Disease Maternal Grandfather      Breast Cancer Maternal Grandmother      Heart Disease Paternal Grandfather      Diabetes Paternal Grandfather      Cerebrovascular Disease Paternal Grandfather      Breast Cancer Maternal Aunt        Social History:  Marital Status:   [2]  Social History     Tobacco Use     Smoking status: Former Smoker     Packs/day: 0.50     Years: 3.00     Pack years: 1.50     Quit date: 2004     Years since quittin.0     Smokeless tobacco: Never Used     Tobacco comment:  smokes - discussed secondhand smoke   Substance Use Topics     Alcohol use: Yes     Comment: infrequently     Drug use: No        Medications:    cyclobenzaprine (FLEXERIL) 10 MG tablet  HYDROcodone-acetaminophen (NORCO) 5-325 MG tablet  ondansetron (ZOFRAN ODT) 4 MG ODT tab  ferrous gluconate (FERGON) 324 (38 Fe) MG tablet  UNABLE TO FIND  Vitamin D, Cholecalciferol, 1000 units CAPS  vitamin D3 (CHOLECALCIFEROL) 2000 units (50 mcg) tablet          Review of Systems   All other systems reviewed and are negative.      Physical Exam   BP: (!) 132/92  Pulse:  89  Temp: 97.8  F (36.6  C)  Resp: 18  Weight: 74 kg (163 lb 1.6 oz)  SpO2: 100 %      Physical Exam  Vitals and nursing note reviewed.   Constitutional:       Appearance: She is not diaphoretic.      Comments: In pain with movement   HENT:      Head: Atraumatic.      Mouth/Throat:      Pharynx: No oropharyngeal exudate.   Eyes:      General: No scleral icterus.     Pupils: Pupils are equal, round, and reactive to light.   Cardiovascular:      Rate and Rhythm: Normal rate.      Heart sounds: Normal heart sounds.   Pulmonary:      Effort: Pulmonary effort is normal. No respiratory distress.      Breath sounds: Normal breath sounds.   Musculoskeletal:      Cervical back: Normal, normal range of motion and neck supple.      Thoracic back: Normal.      Lumbar back: No deformity or bony tenderness. Decreased range of motion. Positive right straight leg raise test and positive left straight leg raise test.      Right lower leg: No edema.      Left lower leg: No edema.   Skin:     General: Skin is warm.      Findings: No rash.   Neurological:      Mental Status: She is alert.         ED Course                 Procedures              Critical Care time:  none               No results found for this or any previous visit (from the past 24 hour(s)).    Medications   HYDROcodone-acetaminophen (NORCO) 5-325 MG per tablet 1 tablet (1 tablet Oral Given 5/6/22 0849)   orphenadrine (NORFLEX) injection 60 mg (60 mg Intramuscular Given 5/6/22 0849)   ondansetron (ZOFRAN ODT) ODT tab 4 mg (4 mg Oral Given 5/6/22 0913)       Assessments & Plan (with Medical Decision Making)  Gretchen is a 37-year-old female presenting to the emergency room with complaint of low back pain that is getting worse throughout the week.  No known injury.  See history of focused physical exam as above  Pleasant 37-year-old female who is in mild distress secondary to discomfort related to back pain and position change.  Was able to ambulate back through the  department without any sign of ataxia or any weakness.  Does have generalized paraspinal muscular tenderness and bilateral lower lumbar muscular tenderness, but no midline bony tenderness, step-off, or deformity noted.  Straight leg raise is positive bilaterally.  She does have full range of motion with flexion at the hips and knees.  Normal circulation, no red flag symptoms.  Since there is no known injury and she does not exhibit any red flag symptoms, do not think higher level of imaging is warranted at this time.  We will try to better control her symptoms with muscle relaxer and pain medication.  She may need to follow-up with her primary provider for referral to physical therapy versus imaging if symptoms persist versus other work-up  Improvement with oral Norco and IM Norflex.  Will send prescriptions for Flexeril and Keldron to pharmacy.  Advised on supportive care she can do at home.  Also advised on red flag symptoms that would indicate she needs prompt return to the emergency room for evaluation.  Feels comfortable with this plan.  Discharged in no distress, had a safe ride home after receiving narcotics in the emergency room     I have reviewed the nursing notes.    I have reviewed the findings, diagnosis, plan and need for follow up with the patient.       Discharge Medication List as of 5/6/2022  9:33 AM      START taking these medications    Details   cyclobenzaprine (FLEXERIL) 10 MG tablet Take 1 tablet (10 mg) by mouth 3 times daily as needed for muscle spasms, Disp-20 tablet, R-0, E-Prescribe      HYDROcodone-acetaminophen (NORCO) 5-325 MG tablet Take 1 tablet by mouth every 6 hours as needed for severe pain, Disp-10 tablet, R-0, E-Prescribe      ondansetron (ZOFRAN ODT) 4 MG ODT tab Take 1 tablet (4 mg) by mouth every 6 hours as needed for nausea or vomiting, Disp-20 tablet, R-0, E-Prescribe             Final diagnoses:   Acute bilateral low back pain without sciatica       5/6/2022   [facility]  [facility] EMERGENCY DEPT     Tricia Gonzalez DO  05/06/22 1532

## 2022-05-06 NOTE — ED TRIAGE NOTES
PT comes in w/ lower back pain that started on Monday that has been getting progressively worse through the week. Pain radiates to hips.

## 2022-05-06 NOTE — Clinical Note
Gretchen De Jesus was seen and treated in our emergency department on 5/6/2022.  She may return to work on 05/09/2022.       If you have any questions or concerns, please don't hesitate to call.      Tricia Gonzalez, DO

## 2022-05-06 NOTE — DISCHARGE INSTRUCTIONS
I am glad we were able to ease your pain    You may try over-the-counter lidocaine patches for additional relief.  If you are wearing a lidocaine patch, do not apply heat, only do so if you removed the patch    Can do ice and heat as needed 15 to 20 minutes at a time several times per day to relieve symptoms    You can do Tylenol or ibuprofen per bottle instructions as needed to help with mild or moderate pain    If more severe pain, you may take 1 tablet of the Norco every 6 hours as needed.  Norco has Tylenol in it so do not take more than 4000 mg of Tylenol in a 24-hour period.  Norco can also make you drowsy so do not drive or drink alcohol if taking this medicine    Additionally, a prescription for a muscle relaxer was sent to the pharmacy.  You may take this up to 3 times daily as needed to help with muscle spasms and pain.  It can also make you drowsy, so do not drive or drink alcohol if taking Flexeril    Follow-up closely with your primary provider.  They may elect to refer you to physical therapy, or may need to consider imaging if your symptoms do not improve    If you develop numbness or tingling in between your thighs or in your groin, have incontinence of bowel or bladder, or weakness of your legs, return promptly to the ER for evaluation

## 2022-11-21 ENCOUNTER — HEALTH MAINTENANCE LETTER (OUTPATIENT)
Age: 37
End: 2022-11-21

## 2023-06-02 ENCOUNTER — HEALTH MAINTENANCE LETTER (OUTPATIENT)
Age: 38
End: 2023-06-02

## 2023-06-27 ENCOUNTER — OFFICE VISIT (OUTPATIENT)
Dept: FAMILY MEDICINE | Facility: CLINIC | Age: 38
End: 2023-06-27
Payer: COMMERCIAL

## 2023-06-27 VITALS
TEMPERATURE: 98 F | OXYGEN SATURATION: 100 % | HEART RATE: 121 BPM | WEIGHT: 163 LBS | HEIGHT: 61 IN | RESPIRATION RATE: 18 BRPM | BODY MASS INDEX: 30.78 KG/M2

## 2023-06-27 DIAGNOSIS — R55 SYNCOPE, UNSPECIFIED SYNCOPE TYPE: ICD-10-CM

## 2023-06-27 DIAGNOSIS — Z00.00 WELL ADULT EXAM: Primary | ICD-10-CM

## 2023-06-27 LAB
ANION GAP SERPL CALCULATED.3IONS-SCNC: 10 MMOL/L (ref 7–15)
BUN SERPL-MCNC: 12.9 MG/DL (ref 6–20)
CALCIUM SERPL-MCNC: 9.4 MG/DL (ref 8.6–10)
CHLORIDE SERPL-SCNC: 102 MMOL/L (ref 98–107)
CREAT SERPL-MCNC: 0.8 MG/DL (ref 0.51–0.95)
DEPRECATED HCO3 PLAS-SCNC: 26 MMOL/L (ref 22–29)
ERYTHROCYTE [DISTWIDTH] IN BLOOD BY AUTOMATED COUNT: 12.5 % (ref 10–15)
GFR SERPL CREATININE-BSD FRML MDRD: >90 ML/MIN/1.73M2
GLUCOSE SERPL-MCNC: 100 MG/DL (ref 70–99)
HCT VFR BLD AUTO: 40 % (ref 35–47)
HGB BLD-MCNC: 13 G/DL (ref 11.7–15.7)
MCH RBC QN AUTO: 29 PG (ref 26.5–33)
MCHC RBC AUTO-ENTMCNC: 32.5 G/DL (ref 31.5–36.5)
MCV RBC AUTO: 89 FL (ref 78–100)
PLATELET # BLD AUTO: 315 10E3/UL (ref 150–450)
POTASSIUM SERPL-SCNC: 3.8 MMOL/L (ref 3.4–5.3)
RBC # BLD AUTO: 4.49 10E6/UL (ref 3.8–5.2)
SODIUM SERPL-SCNC: 138 MMOL/L (ref 136–145)
TSH SERPL DL<=0.005 MIU/L-ACNC: 0.4 UIU/ML (ref 0.3–4.2)
WBC # BLD AUTO: 8.2 10E3/UL (ref 4–11)

## 2023-06-27 PROCEDURE — 90471 IMMUNIZATION ADMIN: CPT | Performed by: OBSTETRICS & GYNECOLOGY

## 2023-06-27 PROCEDURE — 84443 ASSAY THYROID STIM HORMONE: CPT | Performed by: OBSTETRICS & GYNECOLOGY

## 2023-06-27 PROCEDURE — 85027 COMPLETE CBC AUTOMATED: CPT | Performed by: OBSTETRICS & GYNECOLOGY

## 2023-06-27 PROCEDURE — 90746 HEPB VACCINE 3 DOSE ADULT IM: CPT | Performed by: OBSTETRICS & GYNECOLOGY

## 2023-06-27 PROCEDURE — 80048 BASIC METABOLIC PNL TOTAL CA: CPT | Performed by: OBSTETRICS & GYNECOLOGY

## 2023-06-27 PROCEDURE — 36415 COLL VENOUS BLD VENIPUNCTURE: CPT | Performed by: OBSTETRICS & GYNECOLOGY

## 2023-06-27 PROCEDURE — 99385 PREV VISIT NEW AGE 18-39: CPT | Mod: 25 | Performed by: OBSTETRICS & GYNECOLOGY

## 2023-06-27 ASSESSMENT — ENCOUNTER SYMPTOMS
BREAST MASS: 0
DIARRHEA: 0
HEMATURIA: 0
JOINT SWELLING: 0
WEAKNESS: 0
ABDOMINAL PAIN: 0
DIZZINESS: 0
PARESTHESIAS: 0
PALPITATIONS: 0
FEVER: 0
DYSURIA: 0
CHILLS: 0
COUGH: 0
NERVOUS/ANXIOUS: 0
MYALGIAS: 0
NAUSEA: 0
HEADACHES: 0
SHORTNESS OF BREATH: 0
HEARTBURN: 0
SORE THROAT: 0
ARTHRALGIAS: 0
EYE PAIN: 0
FREQUENCY: 0
CONSTIPATION: 0
HEMATOCHEZIA: 0

## 2023-06-27 ASSESSMENT — PAIN SCALES - GENERAL: PAINLEVEL: NO PAIN (0)

## 2023-06-27 NOTE — PROGRESS NOTES
Subjective:Gretchen is here for yearly physical. Current concerns are: She has had several episodes over the last year where she felt lightheaded for period of time.  One was while she was working and she was sitting at her desk and it was after much so she does not think it was hypoglycemia but she did not check her pulse she does not recall any chest pain or anything else just felt lightheaded or felt a little dizzy.  These episodes have not been frequent.  She was not doing anything to precipitate the syncope.  Just sitting at her desk.        Past Medical History:   Diagnosis Date     Abnormal glandular Papanicolaou smear of cervix     Abn. Pap smear (cervix)     Allergic rhinitis, cause unspecified     Allergic rhinitis     Cervical dysplasia      Hypoglycemia      Migraine      Other spontaneous pneumothorax 09/14/06    D/C 09/17/06-left spontaneous pneumothorax     PCOS (polycystic ovarian syndrome)       Current Outpatient Medications   Medication Sig Dispense Refill     Vitamin D, Cholecalciferol, 1000 units CAPS Take 1,000 Int'l Units by mouth 2 times daily       ferrous gluconate (FERGON) 324 (38 Fe) MG tablet Take 1 tablet (324 mg) by mouth daily (with breakfast) (Patient not taking: Reported on 5/26/2020) 90 tablet 1     ondansetron (ZOFRAN ODT) 4 MG ODT tab Take 1 tablet (4 mg) by mouth every 6 hours as needed for nausea or vomiting (Patient not taking: Reported on 6/27/2023) 20 tablet 0     UNABLE TO FIND MEDICATION NAME: healthy hair and nails       vitamin D3 (CHOLECALCIFEROL) 2000 units (50 mcg) tablet Take 1 tablet (2,000 Units) by mouth daily (Patient not taking: Reported on 6/27/2023) 90 tablet 1      No Known Allergies   History   Smoking Status     Former     Packs/day: 0.50     Years: 3.00     Quit date: 5/1/2004   Smokeless Tobacco     Never     Comment:  smokes - discussed secondhand smoke      Past Surgical History:   Procedure Laterality Date     CONIZATION LEEP  05/12/08    LEEP  "    DILATION AND CURETTAGE, HYSTEROSCOPY DIAGNOSTIC, COMBINED N/A 3/25/2019    Procedure: Hysteroscopy, Dilatation and Curettage;  Surgeon: Eamon Rodriges MD;  Location: [facility] OR     HC EXCIS PRIMARY GANGLION WRIST  7/28/10     HC EXCISION TENDON SHEATH LESION, HAND/FINGR  09/15/10    left hand     HYSTEROSCOPY  02/01/10     and dilation and curettage and removal of uterine polyp     SURGICAL HISTORY OF -   age 3    ?eye surgery      Social History     Tobacco Use     Smoking status: Former     Packs/day: 0.50     Years: 3.00     Pack years: 1.50     Types: Cigarettes     Quit date: 2004     Years since quittin.1     Smokeless tobacco: Never     Tobacco comments:      smokes - discussed secondhand smoke   Substance Use Topics     Alcohol use: Yes     Comment: infrequently     Drug use: No      Family History   Problem Relation Age of Onset     Neurologic Disorder Mother         brain, fibromyalgia     Cancer Maternal Grandfather         skin     Heart Disease Maternal Grandfather      Breast Cancer Maternal Grandmother      Heart Disease Paternal Grandfather      Diabetes Paternal Grandfather      Cerebrovascular Disease Paternal Grandfather      Breast Cancer Maternal Aunt        ROS: A 12 point review of systems is negative except for the following: See above      Physical Exam: Pulse (!) 121   Temp 98  F (36.7  C) (Temporal)   Resp 18   Ht 1.549 m (5' 1\")   Wt 73.9 kg (163 lb)   LMP 2023 (Exact Date)   SpO2 100%   BMI 30.80 kg/m   /70 recheck pulse 84  HEENT:    Sclerae and conjunctiva are normal.   Ear canals and TMs look normal.  Nasal mucosa is pink  - no polyps or masses seen.  Throat is unremarkable . Mucous membranes are moist.   Neck is supple, mobile, no adenopathy or masses palpable. The thyroid feels normal.   Normal range of motion noted.  Chest is clear to auscultation.  No wheezes, rales or rhonchi heard.  Cardiac exam is normal with s1, s2, no murmurs or " adventitious sounds.Normal rate and rhythm is heard.   Abdomen is soft,  nondistended, No masses felt.No HSM. No guarding or rigidity or rebound   noted. Palpation reveals  no    tenderness   Normal bowel sounds heard.   Pelvic exam: deferred- she is on menses- declined      The breast exam was declined.      We did discuss the option for an exam   and I suggested that she should be doing a self-breast exam   monthly and after the age of 40 a yearly mammogram   and she feels comfortable that this is sufficient.             Assessment/Plan:    The yearly exam is normal except for 1.  Episodes of possible syncope but they have been very infrequent.  The last was a few months ago.  Therefore I do not think a Holter monitor is going to be of much use.  I think I will start by checking hemoglobin/CBC as well as TSH and basic panel.  I asked her that if any future episodes occur that she check her pulse to see if it is rapid or slow.  Also keep an eye on whether she ate recently or not.  She may need more evaluation but she has previously had EKG and thorough work-up in the past but it has been a few years.  If all of the blood tests are normal and she is asymptomatic we will leave the work-up at that point but if she does develop future episodes and she may need more extended work-up.    2.  She is on menses today so she declined a Pap today but she is due since her last Pap was 2018.  We will set her up for an appointment with Lesley Campbell CNP  in the near future just to have the Pap done.          Additional Plan:Diet and rest and exercise previously discussed.      I have advised going for a 5 mile walk daily if possible       See labs and orders.    .Immunizations reviewed(TDAP, pneumovax, shingles vaccine,etc)and discussed-including advice for yearly flu shot/tetanus update.     The following vaccines given today:    Hep B #2           Mammogram  Is advised beginning at age 40-pt to be responsible for getting this  done         Labs done: see orders      I advised the following exams with specialists:    1. Dental evaluation yearly    2. Dermatology evaluation for mole exam yearly    3. Ophthalmology exam yearly to check for glaucoma, etc              Followup in 12   months, sooner if concerns arise.   LAMA DELIA Rodriges MD(electronic signature)

## 2023-06-27 NOTE — NURSING NOTE
Prior to immunization administration, verified patients identity using patient s name and date of birth. Please see Immunization Activity for additional information.     Screening Questionnaire for Adult Immunization    Are you sick today?   No   Do you have allergies to medications, food, a vaccine component or latex?   No   Have you ever had a serious reaction after receiving a vaccination?   No   Do you have a long-term health problem with heart, lung, kidney, or metabolic disease (e.g., diabetes), asthma, a blood disorder, no spleen, complement component deficiency, a cochlear implant, or a spinal fluid leak?  Are you on long-term aspirin therapy?   No   Do you have cancer, leukemia, HIV/AIDS, or any other immune system problem?   No   Do you have a parent, brother, or sister with an immune system problem?   No   In the past 3 months, have you taken medications that affect  your immune system, such as prednisone, other steroids, or anticancer drugs; drugs for the treatment of rheumatoid arthritis, Crohn s disease, or psoriasis; or have you had radiation treatments?   No   Have you had a seizure, or a brain or other nervous system problem?   No   During the past year, have you received a transfusion of blood or blood    products, or been given immune (gamma) globulin or antiviral drug?   No   For women: Are you pregnant or is there a chance you could become       pregnant during the next month?   No   Have you received any vaccinations in the past 4 weeks?   No     Immunization questionnaire answers were all negative.      Patient instructed to remain in clinic for 15 minutes afterwards, and to report any adverse reactions.     Screening performed by Katalina Bowman CMA on 6/27/2023 at 3:55 PM.

## 2023-08-08 ENCOUNTER — OFFICE VISIT (OUTPATIENT)
Dept: FAMILY MEDICINE | Facility: CLINIC | Age: 38
End: 2023-08-08
Payer: COMMERCIAL

## 2023-08-08 VITALS
SYSTOLIC BLOOD PRESSURE: 124 MMHG | HEIGHT: 62 IN | BODY MASS INDEX: 29.63 KG/M2 | OXYGEN SATURATION: 97 % | RESPIRATION RATE: 18 BRPM | HEART RATE: 96 BPM | TEMPERATURE: 97.4 F | WEIGHT: 161 LBS | DIASTOLIC BLOOD PRESSURE: 72 MMHG

## 2023-08-08 DIAGNOSIS — Z12.4 CERVICAL CANCER SCREENING: Primary | ICD-10-CM

## 2023-08-08 PROCEDURE — 87624 HPV HI-RISK TYP POOLED RSLT: CPT | Performed by: NURSE PRACTITIONER

## 2023-08-08 PROCEDURE — 99202 OFFICE O/P NEW SF 15 MIN: CPT | Performed by: NURSE PRACTITIONER

## 2023-08-08 PROCEDURE — G0145 SCR C/V CYTO,THINLAYER,RESCR: HCPCS | Performed by: NURSE PRACTITIONER

## 2023-08-08 ASSESSMENT — PAIN SCALES - GENERAL: PAINLEVEL: NO PAIN (0)

## 2023-08-08 NOTE — PROGRESS NOTES
"  Assessment & Plan   Problem List Items Addressed This Visit    None  Visit Diagnoses       Cervical cancer screening    -  Primary    Relevant Orders    Pap Screen with HPV - recommended age 30 - 65 years               25 minutes spent by me on the date of the encounter doing chart review, review of test results, interpretation of tests, patient visit, and documentation        BMI:   Estimated body mass index is 29.93 kg/m  as calculated from the following:    Height as of this encounter: 1.562 m (5' 1.5\").    Weight as of this encounter: 73 kg (161 lb).   Recommend dietary and lifestyle changes      Lesley Campbell NP  [facility] ALEXIS Godinez is a 38 year old, presenting for the following health issues:  Gyn Exam      History of Present Illness       Reason for visit:  Pap smear & pelvic exam. Was menstruating during physical, so had to scheduke it separately.    She eats 0-1 servings of fruits and vegetables daily.She consumes 0 sweetened beverage(s) daily.She exercises with enough effort to increase her heart rate 10 to 19 minutes per day.  She exercises with enough effort to increase her heart rate 3 or less days per week.        Patient here for pap    Wanted to review glucose from her last visit      Review of Systems   Constitutional, HEENT, cardiovascular, pulmonary, GI, , musculoskeletal, neuro, skin, endocrine and psych systems are negative, except as otherwise noted.      Objective    /72   Pulse 96   Temp 97.4  F (36.3  C) (Temporal)   Resp 18   Ht 1.562 m (5' 1.5\")   Wt 73 kg (161 lb)   LMP 07/21/2023 (Exact Date)   SpO2 97%   BMI 29.93 kg/m    Body mass index is 29.93 kg/m .  Physical Exam   GENERAL: healthy, alert and no distress   (female): normal female external genitalia, normal urethral meatus, vaginal mucosa pink, moist, well rugated, and normal cervix/adnexa/uterus without masses or discharge  PSYCH: mentation appears normal, affect " normal/bright    No results found for this or any previous visit (from the past 24 hour(s)).

## 2023-08-10 LAB
BKR LAB AP GYN ADEQUACY: NORMAL
BKR LAB AP GYN INTERPRETATION: NORMAL
BKR LAB AP HPV REFLEX: NORMAL
BKR LAB AP PREVIOUS ABNORMAL: NORMAL
PATH REPORT.COMMENTS IMP SPEC: NORMAL
PATH REPORT.COMMENTS IMP SPEC: NORMAL
PATH REPORT.RELEVANT HX SPEC: NORMAL

## 2023-08-11 LAB
HUMAN PAPILLOMA VIRUS 16 DNA: NEGATIVE
HUMAN PAPILLOMA VIRUS 18 DNA: NEGATIVE
HUMAN PAPILLOMA VIRUS FINAL DIAGNOSIS: NORMAL
HUMAN PAPILLOMA VIRUS OTHER HR: NEGATIVE

## 2024-05-28 ENCOUNTER — PATIENT OUTREACH (OUTPATIENT)
Dept: CARE COORDINATION | Facility: CLINIC | Age: 39
End: 2024-05-28

## 2024-06-11 ENCOUNTER — PATIENT OUTREACH (OUTPATIENT)
Dept: CARE COORDINATION | Facility: CLINIC | Age: 39
End: 2024-06-11

## 2024-07-03 ENCOUNTER — OFFICE VISIT (OUTPATIENT)
Dept: ORTHOPEDICS | Facility: CLINIC | Age: 39
End: 2024-07-03

## 2024-07-03 ENCOUNTER — ANCILLARY PROCEDURE (OUTPATIENT)
Dept: GENERAL RADIOLOGY | Facility: CLINIC | Age: 39
End: 2024-07-03
Attending: PEDIATRICS

## 2024-07-03 VITALS — BODY MASS INDEX: 29.63 KG/M2 | WEIGHT: 161 LBS | HEIGHT: 62 IN

## 2024-07-03 DIAGNOSIS — M79.642 LEFT HAND PAIN: ICD-10-CM

## 2024-07-03 DIAGNOSIS — M79.642 LEFT HAND PAIN: Primary | ICD-10-CM

## 2024-07-03 DIAGNOSIS — R20.0 NUMBNESS AND TINGLING IN LEFT HAND: ICD-10-CM

## 2024-07-03 DIAGNOSIS — R20.2 NUMBNESS AND TINGLING IN LEFT HAND: ICD-10-CM

## 2024-07-03 PROCEDURE — 73130 X-RAY EXAM OF HAND: CPT | Mod: TC | Performed by: RADIOLOGY

## 2024-07-03 PROCEDURE — 99203 OFFICE O/P NEW LOW 30 MIN: CPT | Performed by: PEDIATRICS

## 2024-07-03 NOTE — PATIENT INSTRUCTIONS
Reviewed nature of the left hand and wrist pain, as well as sensation change into the left hand, particular ulnar 2 digits.  Suspect ulnar nerve involvement.  Suspect this is coming more from the elbow, though symptoms are more present at the level of the wrist and distally.  Okay to continue with the wrist bracing, compression that you have.  We discussed potential addition of a towel roll for the elbow with sleep.  Towel roll for the elbow:  Keeping the elbow relatively straight, wrap a decent size towel around the elbow to keep the elbow straight during sleep. You may be able to wrap tape around the towel to maintain its shape. You may need to affix the towel to a shirt such as with safety pins.  Plan to use the towel roll routinely at night for now.    Okay to continue with simple exercises as you have been doing.  Additionally, few home exercises reviewed today.  Plan to monitor over the next few days, up to 1 week to begin.  If improving, then can continue with monitoring.  Otherwise, future considerations could include short course of oral steroid, referral to hand therapy, also addition of MRI of the wrist (more focal pain at the area, as well as history of breast issues addressed surgically), also possible EMG depending on duration and nature of symptoms.  Contact clinic in 7 to 10 days if not getting desired improvement, sooner if needed.    If you have any further questions for your physician or physician s care team you can contact them thru [employer] or by calling [phone].

## 2024-07-03 NOTE — PROGRESS NOTES
ASSESSMENT & PLAN    Gretchen was seen today for pain.    Diagnoses and all orders for this visit:    Left hand pain  -     XR Hand Left G/E 3 Views; Future    Numbness and tingling in left hand        See Patient Instructions  Patient Instructions   Reviewed nature of the left hand and wrist pain, as well as sensation change into the left hand, particular ulnar 2 digits.  Suspect ulnar nerve involvement.  Suspect this is coming more from the elbow, though symptoms are more present at the level of the wrist and distally.  Okay to continue with the wrist bracing, compression that you have.  We discussed potential addition of a towel roll for the elbow with sleep.  Towel roll for the elbow:  Keeping the elbow relatively straight, wrap a decent size towel around the elbow to keep the elbow straight during sleep. You may be able to wrap tape around the towel to maintain its shape. You may need to affix the towel to a shirt such as with safety pins.  Plan to use the towel roll routinely at night for now.    Okay to continue with simple exercises as you have been doing.  Additionally, few home exercises reviewed today.  Plan to monitor over the next few days, up to 1 week to begin.  If improving, then can continue with monitoring.  Otherwise, future considerations could include short course of oral steroid, referral to hand therapy, also addition of MRI of the wrist (more focal pain at the area, as well as history of breast issues addressed surgically), also possible EMG depending on duration and nature of symptoms.  Contact clinic in 7 to 10 days if not getting desired improvement, sooner if needed.    If you have any further questions for your physician or physician s care team you can contact them thru MyChart or by calling [phone].      [facility]    -----  Chief Complaint   Patient presents with    Left Wrist - Pain       SUBJECTIVE  Gretchen MOULTON MARTIR  "Neal is a/an 39 year old female who is seen as a WALK IN patient for evaluation of left wrist pain.     The patient is seen by themselves.  The patient is Right handed    Onset: 1.5 day(s) ago. Reports insidious onset without acute precipitating event.  Location of Pain: medial wrist, pain with moving ring and pinky finger   Worsened by: lifting, gripping   Treatments tried: Tylenol, ibuprofen, and dandelion oil, massage, compression glove, ace wrap   Associated symptoms: soreness   Orthopedic/Surgical history: YES - Date: 2011 goider removed from wrist and ruptured tendon, had an extra tendon repaired   Social History/Occupation: office desk       **  Above information per rooming staff.  Additional history:  Routine typing for work. No change in activities. No injury.  Pain is ulnar wrist, just proximal to pisiform.  Notes pain with movement of ulnar 2 digits, and pain radiates to hand, wrist.  Previously with surgery had sudden onset of pain, no injury; at time of surgery was found to have torn tendon that was addressed, was an extra tendon per pt.  Has some numbness/tingling, comes and goes, similar distribution in hand, wrist, also into ulnar 2 digits. That tingling sensation has been present for about a month or so, but intermittent.    Also had cyst removed dorsal left wrist same time.    See op note from 9/15/10. Reviewed note.          REVIEW OF SYSTEMS:  Review of Systems    OBJECTIVE:  Ht 1.562 m (5' 1.5\")   Wt 73 kg (161 lb)   BMI 29.93 kg/m           Tinel pos cubital tunnel on left  Left elbow grossly full ROM, no change in symptoms        Hand/wrist (left):    Inspection:  No deformity noted.  No swelling. No ecchymosis.    Motion:  Forearm pronation , forearm supination .  Wrist flexion   Wrist extension   Wrist radial deviation   Wrist ulnar deviation   Digit motion   All grossly intact    Strength:  , finger abduction grossly intact    Radial pulses normal, +2/4, capillary refill " brisk.    Palpation:  Tender mild hypothenar eminence, ulnar wrist area volar aspect          RADIOLOGY:  Final results and radiologist's interpretation, available in the [facility] health record.  Images were reviewed with the patient in the office today.  My personal interpretation of the performed imaging: No acute bony abnormality noted.      XR Hand Left G/E 3 Views    Narrative    HAND LEFT THREE OR MORE VIEWS   7/3/2024 9:54 AM     HISTORY: Left hand pain.    COMPARISON: None.      Impression    IMPRESSION: Normal.    MARQUEZ MARIEE MD         SYSTEM ID:  [id number]

## 2024-07-03 NOTE — LETTER
7/3/2024      Gretchen De Jesus  [address] [place] [address]      Dear Colleague,    Thank you for referring your patient, Gretchen De Jesus, to the [facility]. Please see a copy of my visit note below.    ASSESSMENT & PLAN    Gretchen was seen today for pain.    Diagnoses and all orders for this visit:    Left hand pain  -     XR Hand Left G/E 3 Views; Future    Numbness and tingling in left hand        See Patient Instructions  Patient Instructions   Reviewed nature of the left hand and wrist pain, as well as sensation change into the left hand, particular ulnar 2 digits.  Suspect ulnar nerve involvement.  Suspect this is coming more from the elbow, though symptoms are more present at the level of the wrist and distally.  Okay to continue with the wrist bracing, compression that you have.  We discussed potential addition of a towel roll for the elbow with sleep.  Towel roll for the elbow:  Keeping the elbow relatively straight, wrap a decent size towel around the elbow to keep the elbow straight during sleep. You may be able to wrap tape around the towel to maintain its shape. You may need to affix the towel to a shirt such as with safety pins.  Plan to use the towel roll routinely at night for now.    Okay to continue with simple exercises as you have been doing.  Additionally, few home exercises reviewed today.  Plan to monitor over the next few days, up to 1 week to begin.  If improving, then can continue with monitoring.  Otherwise, future considerations could include short course of oral steroid, referral to hand therapy, also addition of MRI of the wrist (more focal pain at the area, as well as history of breast issues addressed surgically), also possible EMG depending on duration and nature of symptoms.  Contact clinic in 7 to 10 days if not getting desired improvement, sooner if needed.    If you have any further questions for your physician or  "physician s care team you can contact them thru MyChart or by calling [phone].      [facility]    -----  Chief Complaint   Patient presents with     Left Wrist - Pain       SUBJECTIVE  Gretchen De Jesus is a/an 39 year old female who is seen as a WALK IN patient for evaluation of left wrist pain.     The patient is seen by themselves.  The patient is Right handed    Onset: 1.5 day(s) ago. Reports insidious onset without acute precipitating event.  Location of Pain: medial wrist, pain with moving ring and pinky finger   Worsened by: lifting, gripping   Treatments tried: Tylenol, ibuprofen, and dandelion oil, massage, compression glove, ace wrap   Associated symptoms: soreness   Orthopedic/Surgical history: YES - Date: 2011 goider removed from wrist and ruptured tendon, had an extra tendon repaired   Social History/Occupation: office desk       **  Above information per rooming staff.  Additional history:  Routine typing for work. No change in activities. No injury.  Pain is ulnar wrist, just proximal to pisiform.  Notes pain with movement of ulnar 2 digits, and pain radiates to hand, wrist.  Previously with surgery had sudden onset of pain, no injury; at time of surgery was found to have torn tendon that was addressed, was an extra tendon per pt.  Has some numbness/tingling, comes and goes, similar distribution in hand, wrist, also into ulnar 2 digits. That tingling sensation has been present for about a month or so, but intermittent.    Also had cyst removed dorsal left wrist same time.    See op note from 9/15/10. Reviewed note.          REVIEW OF SYSTEMS:  Review of Systems    OBJECTIVE:  Ht 1.562 m (5' 1.5\")   Wt 73 kg (161 lb)   BMI 29.93 kg/m           Tinel pos cubital tunnel on left  Left elbow grossly full ROM, no change in symptoms        Hand/wrist (left):    Inspection:  No deformity noted.  No swelling. No " ecchymosis.    Motion:  Forearm pronation , forearm supination .  Wrist flexion   Wrist extension   Wrist radial deviation   Wrist ulnar deviation   Digit motion   All grossly intact    Strength:  , finger abduction grossly intact    Radial pulses normal, +2/4, capillary refill brisk.    Palpation:  Tender mild hypothenar eminence, ulnar wrist area volar aspect          RADIOLOGY:  Final results and radiologist's interpretation, available in the [facility] health record.  Images were reviewed with the patient in the office today.  My personal interpretation of the performed imaging: No acute bony abnormality noted.      XR Hand Left G/E 3 Views    Narrative    HAND LEFT THREE OR MORE VIEWS   7/3/2024 9:54 AM     HISTORY: Left hand pain.    COMPARISON: None.      Impression    IMPRESSION: Normal.    MARQUEZ MARIEE MD         SYSTEM ID:  [id number]                  Again, thank you for allowing me to participate in the care of your patient.        Sincerely,        Carlo Garcias DO

## 2024-09-01 ENCOUNTER — HEALTH MAINTENANCE LETTER (OUTPATIENT)
Age: 39
End: 2024-09-01

## 2024-11-21 NOTE — NURSING NOTE
"Teledermatology Nurse Call for NEW Patients (not seen in last 3 years):     The patient was contacted by phone and we reviewed, \"Due to the coronavirus pandemic, we are calling to reschedule your upcoming visit and offer you a teledermatology visit. This would be assessed by an MD or PAKELSEY;  Importantly, \"a teledermatology visit may not be as thorough as an in-person visit, and the quality of the photograph and/or video sent may not be of the same quality as that taken by the dermatology clinic.\" After discussing the risks, benefits, and alternatives, the patient would like to proceed with teledermatology because of National Emergency Regarding Coronavirus disease (COVID 19) Outbreak.\"    This video visit will be conducted via a call between you and your physician/provider via [employer]. We have found that certain health care needs can be provided without the need for an in-person physical exam.  This service lets us provide the care you need with a video conversation.  If a prescription is necessary we can send it directly to your pharmacy.  If lab work is needed we can place an order for that and you can then stop by our lab to have the test done at a later time.If during the course of the call the physician/provider feels a video visit is not appropriate, you will not be charged for this service.    Patient would like the video invitation sent by: Text to cell phone: [phone]     The patient will also send photographs via bluebird bio for review.       The patient verified the location of the photo/video to be their home address.      For photos, patient told nursing these are already uploaded     The patient was instructed to take photos of all areas of concern and all areas of any rash.       The patient denied skin pain, fever, mucosal symptoms(lesions, blisters, sores in the mouth, nose, eyes, or genitals)  IF PATIENT ENDORSES ANY OF THESE STOP AND PAGE ON CALL ATTENDING    Additional notes:  Patient summary of " issue: Patient is experiencing hair loss and thinning. She's unsure what is causing it. Her scalp goes through periods where it itches every once in a while. She have noticed some flaking. Denies dryness, itching, pain or redness on scalp.   Location of problem on body:Scalp  How long has area/symptoms been present: A little over year - missing chunks of hair.   What makes it better?: N/A  What makes it worse?:N/A  Other symptoms include the following:N/A  Which mediations have been tried, for how long, and did they make it better or worse (ex. Triamcinolone, used twice daily for 2 weeks, not improved): She uses head and shoulders - twice a week. She does not using a lot of heating products. She uses hair spray 2-3 times a week.   She switched over to Red Thaddeus shampoo, conditioner and mask in the past few weeks now and noticed no difference or improvement.    The patient has seen a dermatologist in the past 14 years.   The patient hasno past medical history of skin cancer, but maternal grandfather.   ROS: The patient is generally feeling well.       Nursing tasks completed  -Pharmacy preference was updated.  -The nurse has dropped in the AVS information *(For adults the phrase is umdermhteleavs and for pediatrics it is their own) for the physician to route in the AVS.                                                                                                                                                                                                                         -The patient was told to contact the clinic if they have not received correspondence within 72 hours.        Statement Selected

## 2025-02-02 ENCOUNTER — HEALTH MAINTENANCE LETTER (OUTPATIENT)
Age: 40
End: 2025-02-02

## 2025-03-11 ENCOUNTER — HOSPITAL ENCOUNTER (OUTPATIENT)
Dept: MAMMOGRAPHY | Facility: CLINIC | Age: 40
Discharge: HOME | End: 2025-03-11
Attending: NURSE PRACTITIONER | Admitting: NURSE PRACTITIONER
Payer: COMMERCIAL

## 2025-03-11 ENCOUNTER — OFFICE VISIT (OUTPATIENT)
Dept: FAMILY MEDICINE | Facility: CLINIC | Age: 40
End: 2025-03-11
Payer: COMMERCIAL

## 2025-03-11 VITALS
RESPIRATION RATE: 16 BRPM | TEMPERATURE: 97.4 F | DIASTOLIC BLOOD PRESSURE: 64 MMHG | BODY MASS INDEX: 30.66 KG/M2 | SYSTOLIC BLOOD PRESSURE: 118 MMHG | OXYGEN SATURATION: 99 % | WEIGHT: 166.6 LBS | HEART RATE: 90 BPM | HEIGHT: 62 IN

## 2025-03-11 DIAGNOSIS — Z00.00 ROUTINE GENERAL MEDICAL EXAMINATION AT A HEALTH CARE FACILITY: Primary | ICD-10-CM

## 2025-03-11 DIAGNOSIS — E55.9 VITAMIN D DEFICIENCY: ICD-10-CM

## 2025-03-11 DIAGNOSIS — Z12.31 VISIT FOR SCREENING MAMMOGRAM: ICD-10-CM

## 2025-03-11 DIAGNOSIS — N92.0 MENORRHAGIA WITH REGULAR CYCLE: ICD-10-CM

## 2025-03-11 LAB
ANION GAP SERPL CALCULATED.3IONS-SCNC: 13 MMOL/L (ref 7–15)
BASOPHILS # BLD AUTO: 0 10E3/UL (ref 0–0.2)
BASOPHILS NFR BLD AUTO: 1 %
BUN SERPL-MCNC: 15.8 MG/DL (ref 6–20)
CALCIUM SERPL-MCNC: 9.8 MG/DL (ref 8.8–10.4)
CHLORIDE SERPL-SCNC: 102 MMOL/L (ref 98–107)
CREAT SERPL-MCNC: 0.66 MG/DL (ref 0.51–0.95)
EGFRCR SERPLBLD CKD-EPI 2021: >90 ML/MIN/1.73M2
EOSINOPHIL # BLD AUTO: 0.1 10E3/UL (ref 0–0.7)
EOSINOPHIL NFR BLD AUTO: 2 %
ERYTHROCYTE [DISTWIDTH] IN BLOOD BY AUTOMATED COUNT: 12.5 % (ref 10–15)
FERRITIN SERPL-MCNC: 33 NG/ML (ref 6–175)
GLUCOSE SERPL-MCNC: 86 MG/DL (ref 70–99)
HCO3 SERPL-SCNC: 24 MMOL/L (ref 22–29)
HCT VFR BLD AUTO: 39.9 % (ref 35–47)
HGB BLD-MCNC: 13.3 G/DL (ref 11.7–15.7)
IMM GRANULOCYTES # BLD: 0 10E3/UL
IMM GRANULOCYTES NFR BLD: 0 %
LYMPHOCYTES # BLD AUTO: 1.6 10E3/UL (ref 0.8–5.3)
LYMPHOCYTES NFR BLD AUTO: 29 %
MCH RBC QN AUTO: 29.3 PG (ref 26.5–33)
MCHC RBC AUTO-ENTMCNC: 33.3 G/DL (ref 31.5–36.5)
MCV RBC AUTO: 88 FL (ref 78–100)
MONOCYTES # BLD AUTO: 0.4 10E3/UL (ref 0–1.3)
MONOCYTES NFR BLD AUTO: 7 %
NEUTROPHILS # BLD AUTO: 3.5 10E3/UL (ref 1.6–8.3)
NEUTROPHILS NFR BLD AUTO: 61 %
NRBC # BLD AUTO: 0 10E3/UL
NRBC BLD AUTO-RTO: 0 /100
PLATELET # BLD AUTO: 283 10E3/UL (ref 150–450)
POTASSIUM SERPL-SCNC: 3.8 MMOL/L (ref 3.4–5.3)
RBC # BLD AUTO: 4.54 10E6/UL (ref 3.8–5.2)
SODIUM SERPL-SCNC: 139 MMOL/L (ref 135–145)
TSH SERPL DL<=0.005 MIU/L-ACNC: 0.68 UIU/ML (ref 0.3–4.2)
VIT D+METAB SERPL-MCNC: 22 NG/ML (ref 20–50)
WBC # BLD AUTO: 5.7 10E3/UL (ref 4–11)

## 2025-03-11 PROCEDURE — 99396 PREV VISIT EST AGE 40-64: CPT | Performed by: NURSE PRACTITIONER

## 2025-03-11 PROCEDURE — 84443 ASSAY THYROID STIM HORMONE: CPT | Performed by: NURSE PRACTITIONER

## 2025-03-11 PROCEDURE — 85025 COMPLETE CBC W/AUTO DIFF WBC: CPT | Performed by: NURSE PRACTITIONER

## 2025-03-11 PROCEDURE — 36415 COLL VENOUS BLD VENIPUNCTURE: CPT | Performed by: NURSE PRACTITIONER

## 2025-03-11 PROCEDURE — 82306 VITAMIN D 25 HYDROXY: CPT | Performed by: NURSE PRACTITIONER

## 2025-03-11 PROCEDURE — 77063 BREAST TOMOSYNTHESIS BI: CPT

## 2025-03-11 PROCEDURE — 1125F AMNT PAIN NOTED PAIN PRSNT: CPT | Performed by: NURSE PRACTITIONER

## 2025-03-11 PROCEDURE — 77067 SCR MAMMO BI INCL CAD: CPT

## 2025-03-11 PROCEDURE — 3074F SYST BP LT 130 MM HG: CPT | Performed by: NURSE PRACTITIONER

## 2025-03-11 PROCEDURE — 3078F DIAST BP <80 MM HG: CPT | Performed by: NURSE PRACTITIONER

## 2025-03-11 PROCEDURE — 80048 BASIC METABOLIC PNL TOTAL CA: CPT | Performed by: NURSE PRACTITIONER

## 2025-03-11 PROCEDURE — 99213 OFFICE O/P EST LOW 20 MIN: CPT | Mod: 25 | Performed by: NURSE PRACTITIONER

## 2025-03-11 PROCEDURE — 82728 ASSAY OF FERRITIN: CPT | Performed by: NURSE PRACTITIONER

## 2025-03-11 SDOH — HEALTH STABILITY: PHYSICAL HEALTH: ON AVERAGE, HOW MANY DAYS PER WEEK DO YOU ENGAGE IN MODERATE TO STRENUOUS EXERCISE (LIKE A BRISK WALK)?: 3 DAYS

## 2025-03-11 SDOH — HEALTH STABILITY: PHYSICAL HEALTH: ON AVERAGE, HOW MANY MINUTES DO YOU ENGAGE IN EXERCISE AT THIS LEVEL?: 20 MIN

## 2025-03-11 ASSESSMENT — SOCIAL DETERMINANTS OF HEALTH (SDOH): HOW OFTEN DO YOU GET TOGETHER WITH FRIENDS OR RELATIVES?: ONCE A WEEK

## 2025-03-11 ASSESSMENT — PAIN SCALES - GENERAL: PAINLEVEL_OUTOF10: MILD PAIN (2)

## 2025-03-11 NOTE — PROGRESS NOTES
"Preventive Care Visit  [facility]  Lesley Campbell NP, Nurse Practitioner - Family  Mar 11, 2025      Assessment & Plan     Routine general medical examination at a health care facility  Discussed recommended vaccines- declined    Menorrhagia with regular cycle  Patient feels she may also be danielle- menopausal. Having hot flashes, night sweats, some mood changes.  Discussed lifestyle and supplements to help with this.  Has heavy periods with clots at times.  Will check labs and pelvic us  - Basic metabolic panel  (Ca, Cl, CO2, Creat, Gluc, K, Na, BUN); Future  - TSH with free T4 reflex; Future  - CBC with platelets and differential; Future  - Ferritin; Future  - US Pelvic Complete with Transvaginal; Future  - TSH with free T4 reflex  - Basic metabolic panel  (Ca, Cl, CO2, Creat, Gluc, K, Na, BUN)  - Ferritin  - CBC with platelets and differential    Vitamin D deficiency  Not currently on replacement.  Check labs  - Vitamin D Deficiency; Future  - Vitamin D Deficiency    Patient has been advised of split billing requirements and indicates understanding: No        BMI  Estimated body mass index is 30.43 kg/m  as calculated from the following:    Height as of this encounter: 1.576 m (5' 2.05\").    Weight as of this encounter: 75.6 kg (166 lb 9.6 oz).   Weight management plan: Discussed healthy diet and exercise guidelines    Counseling  Appropriate preventive services were addressed with this patient via screening, questionnaire, or discussion as appropriate for fall prevention, nutrition, physical activity, Tobacco-use cessation, social engagement, weight loss and cognition.  Checklist reviewing preventive services available has been given to the patient.  Reviewed patient's diet, addressing concerns and/or questions.   She is at risk for lack of exercise and has been provided with information to increase physical activity for the benefit of her well-being.         Subjective "   Gretchen is a 40 year old, presenting for the following:  Physical (Perimenopause )        3/11/2025     7:48 AM   Additional Questions   Roomed by Candi         3/11/2025     7:52 AM   Patient Reported Additional Medications   Patient reports taking the following new medications iron with vitamin C, vitron c dietary supplement          HPI     3 days- then up to 7 spotting.  Changes pad or tampon during day- overnight will go through tampon and have to change it  Advance Care Planning  Patient does not have a Health Care Directive: Discussed advance care planning with patient; information given to patient to review.      3/11/2025   General Health   How would you rate your overall physical health? (!) FAIR   Feel stress (tense, anxious, or unable to sleep) Only a little   (!) STRESS CONCERN      3/11/2025   Nutrition   Three or more servings of calcium each day? Yes   Diet: Regular (no restrictions)   How many servings of fruit and vegetables per day? (!) 0-1   How many sweetened beverages each day? 0-1         3/11/2025   Exercise   Days per week of moderate/strenous exercise 3 days   Average minutes spent exercising at this level 20 min         3/11/2025   Social Factors   Frequency of gathering with friends or relatives Once a week   Worry food won't last until get money to buy more No   Food not last or not have enough money for food? No   Do you have housing? (Housing is defined as stable permanent housing and does not include staying ouside in a car, in a tent, in an abandoned building, in an overnight shelter, or couch-surfing.) No   Are you worried about losing your housing? No   Lack of transportation? No   Unable to get utilities (heat,electricity)? No   Want help with housing or utility concern? No   (!) HOUSING CONCERN PRESENT      3/11/2025   Dental   Dentist two times every year? Yes           Today's PHQ-2 Score:       3/11/2025     7:45 AM   PHQ-2 ( 1999 [employer])   Q1: Little interest or pleasure  in doing things 0   Q2: Feeling down, depressed or hopeless 0   PHQ-2 Score 0    Q1: Little interest or pleasure in doing things Not at all   Q2: Feeling down, depressed or hopeless Not at all   PHQ-2 Score 0       Patient-reported           3/11/2025   Substance Use   Alcohol more than 3/day or more than 7/wk No   Do you use any other substances recreationally? No     Social History     Tobacco Use    Smoking status: Former     Current packs/day: 0.00     Average packs/day: 0.5 packs/day for 3.0 years (1.5 ttl pk-yrs)     Types: Cigarettes     Start date: 2001     Quit date: 2004     Years since quittin.8    Smokeless tobacco: Never    Tobacco comments:      smokes - discussed secondhand smoke   Vaping Use    Vaping status: Never Used   Substance Use Topics    Alcohol use: Yes     Comment: infrequently    Drug use: No           2023   LAST FHS-7 RESULTS   1st degree relative breast or ovarian cancer No    Any relative bilateral breast cancer Unknown    Any male have breast cancer No    Any ONE woman have BOTH breast AND ovarian cancer Yes    Any woman with breast cancer before 50yrs Yes    2 or more relatives with breast AND/OR ovarian cancer Yes    2 or more relatives with breast AND/OR bowel cancer Unknown        Proxy-reported        Mammogram Screening - Mammogram every 1-2 years updated in Health Maintenance based on mutual decision making        3/11/2025   STI Screening   New sexual partner(s) since last STI/HIV test? No     History of abnormal Pap smear: YES - reflected in Problem List and Health Maintenance accordingly        Latest Ref Rng & Units 2023     9:53 AM 10/31/2018    11:18 AM 10/31/2018    11:15 AM   PAP / HPV   PAP  Negative for Intraepithelial Lesion or Malignancy (NILM)      PAP (Historical)   NIL     HPV 16 DNA Negative Negative   Negative    HPV 18 DNA Negative Negative   Negative    Other HR HPV Negative Negative   Negative      ASCVD Risk   The ASCVD  "Risk score (Constantine GUERRERO, et al., 2019) failed to calculate for the following reasons:    Cannot find a previous HDL lab    Cannot find a previous total cholesterol lab        3/11/2025   Contraception/Family Planning   Questions about contraception or family planning No        Reviewed and updated as needed this visit by Provider                    Past Medical History:   Diagnosis Date    Abnormal glandular Papanicolaou smear of cervix     Abn. Pap smear (cervix)    Allergic rhinitis, cause unspecified     Allergic rhinitis    Cervical dysplasia     Hypoglycemia     Migraine     Other spontaneous pneumothorax 09/14/06    D/C 09/17/06-left spontaneous pneumothorax    PCOS (polycystic ovarian syndrome)      Past Surgical History:   Procedure Laterality Date    CONIZATION LEEP  05/12/08    LEEP    DILATION AND CURETTAGE, HYSTEROSCOPY DIAGNOSTIC, COMBINED N/A 3/25/2019    Procedure: Hysteroscopy, Dilatation and Curettage;  Surgeon: Eamon Rodriges MD;  Location: [facility] OR    HC EXCIS PRIMARY GANGLION WRIST  7/28/10    HC EXCISION TENDON SHEATH LESION, HAND/FINGR  09/15/10    left hand    HYSTEROSCOPY  02/01/10     and dilation and curettage and removal of uterine polyp    SURGICAL HISTORY OF -   age 3    ?eye surgery         Review of Systems  Constitutional, HEENT, cardiovascular, pulmonary, GI, , musculoskeletal, neuro, skin, endocrine and psych systems are negative, except as otherwise noted.     Objective    Exam  /64   Pulse 90   Temp 97.4  F (36.3  C) (Temporal)   Resp 16   Ht 1.576 m (5' 2.05\")   Wt 75.6 kg (166 lb 9.6 oz)   LMP 03/02/2025 (Exact Date)   SpO2 99%   BMI 30.43 kg/m     Estimated body mass index is 30.43 kg/m  as calculated from the following:    Height as of this encounter: 1.576 m (5' 2.05\").    Weight as of this encounter: 75.6 kg (166 lb 9.6 oz).    Physical Exam  GENERAL: alert and no distress  EYES: Eyes grossly normal to inspection, PERRL and conjunctivae and sclerae " normal  HENT: ear canals and TM's normal, nose and mouth without ulcers or lesions  NECK: no adenopathy, no asymmetry, masses, or scars  RESP: lungs clear to auscultation - no rales, rhonchi or wheezes  CV: regular rate and rhythm, normal S1 S2, no S3 or S4, no murmur, click or rub, no peripheral edema  ABDOMEN: soft, nontender, no hepatosplenomegaly, no masses and bowel sounds normal  MS: no gross musculoskeletal defects noted, no edema  SKIN: no suspicious lesions or rashes  NEURO: Normal strength and tone, mentation intact and speech normal  PSYCH: mentation appears normal, affect normal/bright        Signed Electronically by: Lesley Campbell, NP
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
## 2025-03-15 ENCOUNTER — HOSPITAL ENCOUNTER (OUTPATIENT)
Dept: ULTRASOUND IMAGING | Facility: CLINIC | Age: 40
Discharge: HOME | End: 2025-03-15
Attending: NURSE PRACTITIONER | Admitting: NURSE PRACTITIONER
Payer: COMMERCIAL

## 2025-03-15 DIAGNOSIS — N92.0 MENORRHAGIA WITH REGULAR CYCLE: ICD-10-CM

## 2025-03-15 PROCEDURE — 76856 US EXAM PELVIC COMPLETE: CPT

## 2025-08-13 ENCOUNTER — TELEPHONE (OUTPATIENT)
Dept: OBGYN | Facility: CLINIC | Age: 40
End: 2025-08-13
Payer: COMMERCIAL

## (undated) DEVICE — PREP SKIN SCRUB TRAY 4461A

## (undated) DEVICE — SPONGE RAY-TEC 4X4" 7317

## (undated) DEVICE — SUCTION CURETTE 3MM ENDOMETRIAL MX140

## (undated) DEVICE — DRSG TELFA 3X8" 1238

## (undated) DEVICE — APPLICATOR SILVER NITRATE 6"

## (undated) DEVICE — GOWN IMPERVIOUS BREATHABLE 2XL/XLONG

## (undated) DEVICE — GOWN XLG DISP 9545

## (undated) DEVICE — CATH SELF FEMALE 14FR 6"

## (undated) DEVICE — TUBING CYSTO/BLADDER IRRIG SET 80" 06544-01

## (undated) DEVICE — SOL NACL 0.9% INJ 1000ML BAG 07983-09

## (undated) DEVICE — SOL NACL 0.9% IRRIG 1000ML BOTTLE 2F7124

## (undated) DEVICE — PAD PERI INDIV WRAP 11" 2022A

## (undated) DEVICE — DRAPE GYN/UROLOGY FLUID POUCH TUR 29455

## (undated) DEVICE — GLOVE PROTEXIS W/NEU-THERA 7.5  2D73TE75

## (undated) DEVICE — PREP POVIDONE IODINE SOLUTION 10% 120ML

## (undated) DEVICE — PREP POVIDONE IODINE SCRUB 7.5% 120ML

## (undated) DEVICE — LUBRICATING JELLY 4.25OZ

## (undated) DEVICE — PACK SET-UP STD 9102

## (undated) RX ORDER — LIDOCAINE HYDROCHLORIDE 20 MG/ML
INJECTION, SOLUTION EPIDURAL; INFILTRATION; INTRACAUDAL; PERINEURAL
Status: DISPENSED
Start: 2019-03-25

## (undated) RX ORDER — KETOROLAC TROMETHAMINE 30 MG/ML
INJECTION, SOLUTION INTRAMUSCULAR; INTRAVENOUS
Status: DISPENSED
Start: 2019-03-25

## (undated) RX ORDER — PROPOFOL 10 MG/ML
INJECTION, EMULSION INTRAVENOUS
Status: DISPENSED
Start: 2019-03-25

## (undated) RX ORDER — FENTANYL CITRATE 50 UG/ML
INJECTION, SOLUTION INTRAMUSCULAR; INTRAVENOUS
Status: DISPENSED
Start: 2019-03-25